# Patient Record
Sex: FEMALE | Race: WHITE | Employment: FULL TIME | URBAN - METROPOLITAN AREA
[De-identification: names, ages, dates, MRNs, and addresses within clinical notes are randomized per-mention and may not be internally consistent; named-entity substitution may affect disease eponyms.]

---

## 2020-02-24 ENCOUNTER — HOSPITAL ENCOUNTER (OUTPATIENT)
Age: 25
Discharge: HOME | End: 2020-02-26
Payer: COMMERCIAL

## 2020-02-24 ENCOUNTER — HOSPITAL ENCOUNTER (OUTPATIENT)
Dept: ULTRASOUND IMAGING | Age: 25
Discharge: HOME | End: 2020-02-24
Payer: COMMERCIAL

## 2020-02-24 LAB
HCT VFR BLD CALC: 40.9 % (ref 34–48)
HEMOGLOBIN: 13 G/DL (ref 11.5–15.5)
MCH RBC QN AUTO: 29.1 PG (ref 26–35)
MCHC RBC AUTO-ENTMCNC: 31.8 % (ref 32–34.5)
MCV RBC AUTO: 91.5 FL (ref 80–99.9)
PDW BLD-RTO: 12.5 FL (ref 11.5–15)
PLATELET # BLD: 310 E9/L (ref 130–450)
PMV BLD AUTO: 9.3 FL (ref 7–12)
RBC # BLD: 4.47 E12/L (ref 3.5–5.5)
WBC # BLD: 9.8 E9/L (ref 4.5–11.5)

## 2020-02-24 PROCEDURE — G0123 SCREEN CERV/VAG THIN LAYER: HCPCS

## 2020-02-24 PROCEDURE — 87491 CHLMYD TRACH DNA AMP PROBE: CPT

## 2020-02-24 PROCEDURE — 86901 BLOOD TYPING SEROLOGIC RH(D): CPT

## 2020-02-24 PROCEDURE — 87591 N.GONORRHOEAE DNA AMP PROB: CPT

## 2020-02-24 PROCEDURE — 76817 TRANSVAGINAL US OBSTETRIC: CPT

## 2020-02-24 PROCEDURE — 85027 COMPLETE CBC AUTOMATED: CPT

## 2020-02-24 PROCEDURE — 86900 BLOOD TYPING SEROLOGIC ABO: CPT

## 2020-02-24 PROCEDURE — 86850 RBC ANTIBODY SCREEN: CPT

## 2020-02-25 LAB
ABO/RH: NORMAL
ANTIBODY SCREEN: NORMAL

## 2020-02-27 ENCOUNTER — PREP FOR PROCEDURE (OUTPATIENT)
Dept: OBGYN | Age: 25
End: 2020-02-27

## 2020-02-27 RX ORDER — SODIUM CHLORIDE 0.9 % (FLUSH) 0.9 %
10 SYRINGE (ML) INJECTION EVERY 12 HOURS SCHEDULED
Status: CANCELLED | OUTPATIENT
Start: 2020-02-27

## 2020-02-27 RX ORDER — SODIUM CHLORIDE 0.9 % (FLUSH) 0.9 %
10 SYRINGE (ML) INJECTION PRN
Status: CANCELLED | OUTPATIENT
Start: 2020-02-27

## 2020-02-27 RX ORDER — SODIUM CHLORIDE, SODIUM LACTATE, POTASSIUM CHLORIDE, CALCIUM CHLORIDE 600; 310; 30; 20 MG/100ML; MG/100ML; MG/100ML; MG/100ML
INJECTION, SOLUTION INTRAVENOUS CONTINUOUS
Status: CANCELLED | OUTPATIENT
Start: 2020-02-27

## 2020-02-28 ENCOUNTER — ANESTHESIA (OUTPATIENT)
Dept: OPERATING ROOM | Age: 25
End: 2020-02-28
Payer: COMMERCIAL

## 2020-02-28 ENCOUNTER — ANESTHESIA EVENT (OUTPATIENT)
Dept: OPERATING ROOM | Age: 25
End: 2020-02-28
Payer: COMMERCIAL

## 2020-02-28 ENCOUNTER — HOSPITAL ENCOUNTER (OUTPATIENT)
Age: 25
Setting detail: OUTPATIENT SURGERY
Discharge: HOME | End: 2020-02-28
Attending: OBSTETRICS & GYNECOLOGY | Admitting: OBSTETRICS & GYNECOLOGY
Payer: COMMERCIAL

## 2020-02-28 VITALS
HEIGHT: 64 IN | BODY MASS INDEX: 24.75 KG/M2 | DIASTOLIC BLOOD PRESSURE: 60 MMHG | SYSTOLIC BLOOD PRESSURE: 115 MMHG | WEIGHT: 145 LBS | TEMPERATURE: 97.7 F | RESPIRATION RATE: 18 BRPM | OXYGEN SATURATION: 100 % | HEART RATE: 78 BPM

## 2020-02-28 VITALS — SYSTOLIC BLOOD PRESSURE: 107 MMHG | TEMPERATURE: 97.2 F | OXYGEN SATURATION: 100 % | DIASTOLIC BLOOD PRESSURE: 55 MMHG

## 2020-02-28 LAB
ABO/RH: NORMAL
ANTIBODY SCREEN: NORMAL
CHLAMYDIA BY THIN PREP: NEGATIVE
N. GONORRHOEAE DNA, THIN PREP: NEGATIVE
RHIG LOT NUMBER: NORMAL
SOURCE: NORMAL

## 2020-02-28 PROCEDURE — 7100000000 HC PACU RECOVERY - FIRST 15 MIN: Performed by: OBSTETRICS & GYNECOLOGY

## 2020-02-28 PROCEDURE — 36415 COLL VENOUS BLD VENIPUNCTURE: CPT

## 2020-02-28 PROCEDURE — 3700000000 HC ANESTHESIA ATTENDED CARE: Performed by: OBSTETRICS & GYNECOLOGY

## 2020-02-28 PROCEDURE — 3700000001 HC ADD 15 MINUTES (ANESTHESIA): Performed by: OBSTETRICS & GYNECOLOGY

## 2020-02-28 PROCEDURE — 6360000002 HC RX W HCPCS: Performed by: OBSTETRICS & GYNECOLOGY

## 2020-02-28 PROCEDURE — 2580000003 HC RX 258: Performed by: NURSE ANESTHETIST, CERTIFIED REGISTERED

## 2020-02-28 PROCEDURE — 2580000003 HC RX 258: Performed by: OBSTETRICS & GYNECOLOGY

## 2020-02-28 PROCEDURE — 7100000011 HC PHASE II RECOVERY - ADDTL 15 MIN: Performed by: OBSTETRICS & GYNECOLOGY

## 2020-02-28 PROCEDURE — 86900 BLOOD TYPING SEROLOGIC ABO: CPT

## 2020-02-28 PROCEDURE — 86850 RBC ANTIBODY SCREEN: CPT

## 2020-02-28 PROCEDURE — 6360000002 HC RX W HCPCS: Performed by: NURSE ANESTHETIST, CERTIFIED REGISTERED

## 2020-02-28 PROCEDURE — 7100000001 HC PACU RECOVERY - ADDTL 15 MIN: Performed by: OBSTETRICS & GYNECOLOGY

## 2020-02-28 PROCEDURE — 2500000003 HC RX 250 WO HCPCS: Performed by: NURSE ANESTHETIST, CERTIFIED REGISTERED

## 2020-02-28 PROCEDURE — 7100000010 HC PHASE II RECOVERY - FIRST 15 MIN: Performed by: OBSTETRICS & GYNECOLOGY

## 2020-02-28 PROCEDURE — 86901 BLOOD TYPING SEROLOGIC RH(D): CPT

## 2020-02-28 PROCEDURE — 88305 TISSUE EXAM BY PATHOLOGIST: CPT

## 2020-02-28 PROCEDURE — 3600000012 HC SURGERY LEVEL 2 ADDTL 15MIN: Performed by: OBSTETRICS & GYNECOLOGY

## 2020-02-28 PROCEDURE — 2709999900 HC NON-CHARGEABLE SUPPLY: Performed by: OBSTETRICS & GYNECOLOGY

## 2020-02-28 PROCEDURE — 3600000002 HC SURGERY LEVEL 2 BASE: Performed by: OBSTETRICS & GYNECOLOGY

## 2020-02-28 PROCEDURE — 2500000003 HC RX 250 WO HCPCS: Performed by: OBSTETRICS & GYNECOLOGY

## 2020-02-28 RX ORDER — SODIUM CHLORIDE 0.9 % (FLUSH) 0.9 %
10 SYRINGE (ML) INJECTION PRN
Status: DISCONTINUED | OUTPATIENT
Start: 2020-02-28 | End: 2020-02-28 | Stop reason: HOSPADM

## 2020-02-28 RX ORDER — DIPHENHYDRAMINE HYDROCHLORIDE 50 MG/ML
12.5 INJECTION INTRAMUSCULAR; INTRAVENOUS
Status: DISCONTINUED | OUTPATIENT
Start: 2020-02-28 | End: 2020-02-28 | Stop reason: HOSPADM

## 2020-02-28 RX ORDER — MIDAZOLAM HYDROCHLORIDE 1 MG/ML
INJECTION INTRAMUSCULAR; INTRAVENOUS PRN
Status: DISCONTINUED | OUTPATIENT
Start: 2020-02-28 | End: 2020-02-28 | Stop reason: SDUPTHER

## 2020-02-28 RX ORDER — SODIUM CHLORIDE 0.9 % (FLUSH) 0.9 %
10 SYRINGE (ML) INJECTION EVERY 12 HOURS SCHEDULED
Status: DISCONTINUED | OUTPATIENT
Start: 2020-02-28 | End: 2020-02-28 | Stop reason: HOSPADM

## 2020-02-28 RX ORDER — HYDROCODONE BITARTRATE AND ACETAMINOPHEN 5; 325 MG/1; MG/1
1 TABLET ORAL PRN
Status: DISCONTINUED | OUTPATIENT
Start: 2020-02-28 | End: 2020-02-28 | Stop reason: HOSPADM

## 2020-02-28 RX ORDER — ONDANSETRON 2 MG/ML
INJECTION INTRAMUSCULAR; INTRAVENOUS PRN
Status: DISCONTINUED | OUTPATIENT
Start: 2020-02-28 | End: 2020-02-28 | Stop reason: SDUPTHER

## 2020-02-28 RX ORDER — SODIUM CHLORIDE, SODIUM LACTATE, POTASSIUM CHLORIDE, CALCIUM CHLORIDE 600; 310; 30; 20 MG/100ML; MG/100ML; MG/100ML; MG/100ML
INJECTION, SOLUTION INTRAVENOUS CONTINUOUS
Status: DISCONTINUED | OUTPATIENT
Start: 2020-02-28 | End: 2020-02-28 | Stop reason: HOSPADM

## 2020-02-28 RX ORDER — FENTANYL CITRATE 50 UG/ML
INJECTION, SOLUTION INTRAMUSCULAR; INTRAVENOUS PRN
Status: DISCONTINUED | OUTPATIENT
Start: 2020-02-28 | End: 2020-02-28 | Stop reason: SDUPTHER

## 2020-02-28 RX ORDER — HYDROCODONE BITARTRATE AND ACETAMINOPHEN 5; 325 MG/1; MG/1
2 TABLET ORAL PRN
Status: DISCONTINUED | OUTPATIENT
Start: 2020-02-28 | End: 2020-02-28 | Stop reason: HOSPADM

## 2020-02-28 RX ORDER — IBUPROFEN 600 MG/1
600 TABLET ORAL EVERY 6 HOURS PRN
Qty: 16 TABLET | Refills: 0 | Status: ON HOLD | OUTPATIENT
Start: 2020-02-28 | End: 2021-01-20 | Stop reason: ALTCHOICE

## 2020-02-28 RX ORDER — DEXAMETHASONE SODIUM PHOSPHATE 4 MG/ML
INJECTION, SOLUTION INTRA-ARTICULAR; INTRALESIONAL; INTRAMUSCULAR; INTRAVENOUS; SOFT TISSUE PRN
Status: DISCONTINUED | OUTPATIENT
Start: 2020-02-28 | End: 2020-02-28 | Stop reason: SDUPTHER

## 2020-02-28 RX ORDER — DOXYCYCLINE HYCLATE 100 MG/1
100 CAPSULE ORAL 2 TIMES DAILY
Qty: 10 CAPSULE | Refills: 0 | Status: SHIPPED | OUTPATIENT
Start: 2020-02-28 | End: 2020-03-04

## 2020-02-28 RX ORDER — MEPERIDINE HYDROCHLORIDE 25 MG/ML
12.5 INJECTION INTRAMUSCULAR; INTRAVENOUS; SUBCUTANEOUS EVERY 5 MIN PRN
Status: DISCONTINUED | OUTPATIENT
Start: 2020-02-28 | End: 2020-02-28 | Stop reason: HOSPADM

## 2020-02-28 RX ORDER — SODIUM CHLORIDE 9 MG/ML
INJECTION, SOLUTION INTRAVENOUS CONTINUOUS PRN
Status: DISCONTINUED | OUTPATIENT
Start: 2020-02-28 | End: 2020-02-28 | Stop reason: SDUPTHER

## 2020-02-28 RX ORDER — LIDOCAINE HYDROCHLORIDE 20 MG/ML
INJECTION, SOLUTION EPIDURAL; INFILTRATION; INTRACAUDAL; PERINEURAL PRN
Status: DISCONTINUED | OUTPATIENT
Start: 2020-02-28 | End: 2020-02-28 | Stop reason: SDUPTHER

## 2020-02-28 RX ADMIN — LIDOCAINE HYDROCHLORIDE 100 MG: 20 INJECTION, SOLUTION EPIDURAL; INFILTRATION; INTRACAUDAL; PERINEURAL at 15:40

## 2020-02-28 RX ADMIN — FENTANYL CITRATE 100 MCG: 50 INJECTION, SOLUTION INTRAMUSCULAR; INTRAVENOUS at 15:40

## 2020-02-28 RX ADMIN — DOXYCYCLINE 100 MG: 100 INJECTION, POWDER, LYOPHILIZED, FOR SOLUTION INTRAVENOUS at 15:33

## 2020-02-28 RX ADMIN — ONDANSETRON HYDROCHLORIDE 4 MG: 2 INJECTION, SOLUTION INTRAMUSCULAR; INTRAVENOUS at 15:46

## 2020-02-28 RX ADMIN — SODIUM CHLORIDE: 9 INJECTION, SOLUTION INTRAVENOUS at 15:35

## 2020-02-28 RX ADMIN — MIDAZOLAM 2 MG: 1 INJECTION INTRAMUSCULAR; INTRAVENOUS at 15:34

## 2020-02-28 RX ADMIN — DEXAMETHASONE SODIUM PHOSPHATE 10 MG: 4 INJECTION, SOLUTION INTRAMUSCULAR; INTRAVENOUS at 15:46

## 2020-02-28 RX ADMIN — HUMAN RHO(D) IMMUNE GLOBULIN 300 MCG: 300 INJECTION, SOLUTION INTRAMUSCULAR at 17:03

## 2020-02-28 RX ADMIN — Medication 999 ML/HR: at 15:51

## 2020-02-28 RX ADMIN — SODIUM CHLORIDE: 9 INJECTION, SOLUTION INTRAVENOUS at 16:11

## 2020-02-28 ASSESSMENT — PAIN SCALES - GENERAL
PAINLEVEL_OUTOF10: 0

## 2020-02-28 ASSESSMENT — PULMONARY FUNCTION TESTS
PIF_VALUE: 21
PIF_VALUE: 7
PIF_VALUE: 18
PIF_VALUE: 18
PIF_VALUE: 2
PIF_VALUE: 18
PIF_VALUE: 2
PIF_VALUE: 18
PIF_VALUE: 18
PIF_VALUE: 15
PIF_VALUE: 4
PIF_VALUE: 4
PIF_VALUE: 2
PIF_VALUE: 18
PIF_VALUE: 15
PIF_VALUE: 18
PIF_VALUE: 2
PIF_VALUE: 18
PIF_VALUE: 18
PIF_VALUE: 1
PIF_VALUE: 18
PIF_VALUE: 2
PIF_VALUE: 21
PIF_VALUE: 1
PIF_VALUE: 5
PIF_VALUE: 0
PIF_VALUE: 18
PIF_VALUE: 16
PIF_VALUE: 4
PIF_VALUE: 15
PIF_VALUE: 2
PIF_VALUE: 15
PIF_VALUE: 0

## 2020-02-28 ASSESSMENT — PAIN DESCRIPTION - PAIN TYPE
TYPE: SURGICAL PAIN
TYPE: SURGICAL PAIN

## 2020-02-28 ASSESSMENT — PAIN DESCRIPTION - LOCATION
LOCATION: ABDOMEN

## 2020-02-28 ASSESSMENT — PAIN - FUNCTIONAL ASSESSMENT: PAIN_FUNCTIONAL_ASSESSMENT: 0-10

## 2020-02-28 NOTE — PROGRESS NOTES
Aurea Martinez [address] Dr Sangeeta Campos called regarding delay in OR because pt ate cereal this am also need pitocin order.   [address] with Dr Sangeeta Campos  Pt update given and also pitocin ordered
products on the day of surgery    [x] If not already done, you can expect a call from registration    [x] You can expect a call the business day prior to procedure to notify you if your arrival time changes    [x] If you receive a survey after surgery we would greatly appreciate your comments    [] Parent/guardian of a minor must accompany their child and remain on the premises  the entire time they are under our care     [] Pediatric patients may bring favorite toy, blanket or comfort item with them    [] A caregiver or family member must remain with the patient during their stay if they are mentally handicapped, have dementia, disoriented or unable to use a call light or would be a safety concern if left unattended    [x] Please notify surgeon if you develop any illness between now and time of surgery (cold, cough, sore throat, fever, nausea, vomiting) or any signs of infections  including skin, wounds, and dental.    [x]  The [facility] is available to fill your prescription here on your day of surgery, ask your preop nurse for details    [x] Other instructions  EDUCATIONAL MATERIALS PROVIDED:    [] PAT Preoperative Education Packet/Booklet     [] Medication List    [] Fluoroscopy Information Pamphlet    [] Transfusion bracelet applied with instructions    [] Joint replacement video reviewed    [] Shower with soap, lather and rinse well, and use CHG wipes provided the evening before surgery as instructed

## 2020-02-28 NOTE — ANESTHESIA PRE PROCEDURE
Department of Anesthesiology  Preprocedure Note       Name:  Kwasi Plummer   Age:  30 y.o.  :  1995                                          MRN:  [id number]         Date:  2020      Surgeon: Kentrell Garcia):  Tucker Terrazas MD    Procedure: DILATATION AND CURETTAGE SUCTION (N/A Vagina )    Medications prior to admission:   Prior to Admission medications    Medication Sig Start Date End Date Taking?  Authorizing Provider   Prenatal Multivit-Min-Fe-FA (PRE- FORMULA PO) Take 1 tablet by mouth daily LD 20   Yes Historical Provider, MD   ibuprofen (ADVIL;MOTRIN) 800 MG tablet Take 1 tablet by mouth every 8 hours  Patient taking differently: Take 800 mg by mouth every 8 hours LD 20   Shawnee Flores MD       Current medications:    Current Facility-Administered Medications   Medication Dose Route Frequency Provider Last Rate Last Dose    doxycycline (VIBRAMYCIN) 100 mg in dextrose 5 % 100 mL IVPB  100 mg Intravenous On Call to OR Tucker Terrazas MD        lactated ringers infusion   Intravenous Continuous Tucker Terrazas MD        sodium chloride flush 0.9 % injection 10 mL  10 mL Intravenous 2 times per day Tucker Terrazas MD        sodium chloride flush 0.9 % injection 10 mL  10 mL Intravenous PRN Tucker Terrazas MD        meperidine (DEMEROL) injection 12.5 mg  12.5 mg Intravenous Q5 Min PRN Denver Andreas, MD        diphenhydrAMINE (BENADRYL) injection 12.5 mg  12.5 mg Intravenous Once PRN Denver Andreas, MD        HYDROcodone-acetaminophen [facility]) 5-325 MG per tablet 1 tablet  1 tablet Oral PRN Denver Andreas, MD        Or    HYDROcodone-acetaminophen (NORCO) 5-325 MG per tablet 2 tablet  2 tablet Oral PRN Denver Andreas, MD        HYDROmorphone (DILAUDID) injection 0.5 mg  0.5 mg Intravenous Q5 Min PRN Denver Andreas, MD        HYDROmorphone (DILAUDID) injection 0.25 mg  0.25 mg Intravenous Q5 Min PRN Denver Andreas, MD         Facility-Administered Medications Ordered in Other Encounters   Medication Dose Route Frequency Provider Last Rate Last Dose    oxytocin (PITOCIN) 30 units in 500 mL infusion  1 alex-units/min Intravenous Continuous Harley Ferrell MD           Allergies: Allergies   Allergen Reactions    Sulfa Antibiotics Hives     Hives as a child       Problem List:  There is no problem list on file for this patient. Past Medical History:        Diagnosis Date    Missed ab     for OR 2-28-20     Need for rhogam due to Rh negative mother 2016       Past Surgical History:        Procedure Laterality Date    ADENOIDECTOMY Bilateral 2002    WISDOM TOOTH EXTRACTION         Social History:    Social History     Tobacco Use    Smoking status: Never Smoker    Smokeless tobacco: Never Used   Substance Use Topics    Alcohol use: No                                Counseling given: Not Answered      Vital Signs (Current):   Vitals:    02/25/20 1546 02/28/20 1258   BP:  (!) 120/58   Pulse:  88   Resp:  20   Temp:  97.9 °F (36.6 °C)   TempSrc:  Temporal   SpO2:  100%   Weight: 145 lb (65.8 kg) 145 lb (65.8 kg)   Height: 5' 4\" (1.626 m) 5' 4\" (1.626 m)                                              BP Readings from Last 3 Encounters:   02/28/20 (!) 120/58   07/04/16 143/73   06/07/16 127/72       NPO Status: Time of last liquid consumption: 0715                        Time of last solid consumption: 0715                        Date of last liquid consumption: 02/27/20                        Date of last solid food consumption: 02/27/20    BMI:   Wt Readings from Last 3 Encounters:   02/28/20 145 lb (65.8 kg)   07/02/16 173 lb (78.5 kg)   06/07/16 169 lb (76.7 kg)     Body mass index is 24.89 kg/m².     CBC:   Lab Results   Component Value Date    WBC 9.8 02/24/2020    RBC 4.47 02/24/2020    HGB 13.0 02/24/2020    HCT 40.9 02/24/2020    MCV 91.5 02/24/2020    RDW 12.5 02/24/2020     02/24/2020       CMP:   Lab Results   Component Value Date

## 2020-02-28 NOTE — BRIEF OP NOTE
Brief Postoperative Note  ______________________________________________________________    Patient: Mateus Bhatti  YOB: 1995  MRN: [id number]  Date of Procedure: 2/28/2020    Pre-Op Diagnosis: MISSED AB    Post-Op Diagnosis: Same       Procedure(s): SUCTION DILATATION AND CURETTAGE     Anesthesia: General    Surgeon(s):  Ganga Rebolledo MD    First Assistant: Anayeli Sanon PA-C provided ultrasound guidance    Estimated Blood Loss (mL): 50     Fluids: IV Crystalloid & Pitocin per Anesthesia    Complications: None    Specimens:   ID Type Source Tests Collected by Time Destination   A : PRODUCTS OF CONCEPTION Tissue Tissue SURGICAL PATHOLOGY Ganga Rebolledo MD 2/28/2020 1526        Findings: midline echo noted at end of procedure    Disposition: to PACU in stable condition    Jamia Vincent PA-C  Date: 2/28/2020  Time: 4:10 PM

## 2020-02-29 NOTE — OP NOTE
[address] [address]                                OPERATIVE REPORT    PATIENT NAME: Gissell Alex                     :        1995  MED REC NO:   [id number]                            ROOM:  ACCOUNT NO:   [de-identified]                           ADMIT DATE: 2020  PROVIDER:     Pieter Machado MD    DATE OF PROCEDURE:  2020    PREOPERATIVE DIAGNOSIS:  Missed . POSTOPERATIVE DIAGNOSIS:  Missed . PROCEDURE PERFORMED:  Suction D and C under ultrasound guidance. SURGEON:  Pieter Machado MD.    ASSISTANT:  DAVID Iyer.    SPECIMENS:  Products of conception. ESTIMATED BLOOD LOSS:  50 mL. COMPLICATIONS:  None. DESCRIPTION OF PROCEDURE:  The patient was brought to the operative  suite. I have talked to her in the preop area and we reviewed again the  risks of the procedure. She agreed to proceed. She was brought back to  the operative suite, placed under general anesthesia. She was placed in  dorsal lithotomy position, placed in the Anice Cure stirrups, prepped and  draped in the usual sterile fashion. Pelvic exam under anesthesia was  performed that was normal.  Anterior lip of the cervix was grasped with  tenaculum. The entire procedure was done under ultrasound guidance. I  dilated the cervix to allow a size 7 curved curette to be inserted. Suction curettage was done. A gentle sharp curettage was done. There  was a good midline echo. No bleeding. Tenaculum was removed. Tenaculum site was hemostatic. The patient did receive doxycycline,  also Pitocin during the procedure. I told the patient I wanted to see  her in a few weeks in the office. I gave her precautions, to call if  pain, fever, heavy bleeding, or concerns.   Told her I am going to send  her home on doxycycline 100 mg b.i.d. for five days and she understands  she needs a RhoGam shot.        Shelby Baig MD    D: 02/28/2020 16:09:17       T: 02/28/2020 19:30:58     [email or web address]  Job#: [id number]     Doc#: [id number]    CC:

## 2020-04-17 ENCOUNTER — HOSPITAL ENCOUNTER (OUTPATIENT)
Age: 25
Discharge: HOME | End: 2020-04-19
Payer: COMMERCIAL

## 2020-04-17 LAB
GONADOTROPIN, CHORIONIC (HCG) QUANT: 48.8 MIU/ML
HCT VFR BLD CALC: 46.4 % (ref 34–48)
HEMOGLOBIN: 14.7 G/DL (ref 11.5–15.5)
MCH RBC QN AUTO: 29 PG (ref 26–35)
MCHC RBC AUTO-ENTMCNC: 31.7 % (ref 32–34.5)
MCV RBC AUTO: 91.5 FL (ref 80–99.9)
PDW BLD-RTO: 11.9 FL (ref 11.5–15)
PLATELET # BLD: 346 E9/L (ref 130–450)
PMV BLD AUTO: 9.5 FL (ref 7–12)
RBC # BLD: 5.07 E12/L (ref 3.5–5.5)
WBC # BLD: 7 E9/L (ref 4.5–11.5)

## 2020-04-17 PROCEDURE — 86850 RBC ANTIBODY SCREEN: CPT

## 2020-04-17 PROCEDURE — 84702 CHORIONIC GONADOTROPIN TEST: CPT

## 2020-04-17 PROCEDURE — 86880 COOMBS TEST DIRECT: CPT

## 2020-04-17 PROCEDURE — 86900 BLOOD TYPING SEROLOGIC ABO: CPT

## 2020-04-17 PROCEDURE — 85027 COMPLETE CBC AUTOMATED: CPT

## 2020-04-17 PROCEDURE — 86870 RBC ANTIBODY IDENTIFICATION: CPT

## 2020-04-17 PROCEDURE — 86901 BLOOD TYPING SEROLOGIC RH(D): CPT

## 2020-04-18 LAB
ABO/RH: NORMAL
ANTIBODY IDENTIFICATION: NORMAL
ANTIBODY IDENTIFICATION: NORMAL
ANTIBODY SCREEN: NORMAL
DAT POLYSPECIFIC: NORMAL

## 2020-04-20 ENCOUNTER — HOSPITAL ENCOUNTER (OUTPATIENT)
Age: 25
Discharge: HOME | End: 2020-04-22
Payer: COMMERCIAL

## 2020-04-20 LAB — GONADOTROPIN, CHORIONIC (HCG) QUANT: 14.4 MIU/ML

## 2020-04-20 PROCEDURE — 84702 CHORIONIC GONADOTROPIN TEST: CPT

## 2020-04-24 ENCOUNTER — HOSPITAL ENCOUNTER (OUTPATIENT)
Age: 25
Discharge: HOME | End: 2020-04-26
Payer: COMMERCIAL

## 2020-04-24 LAB — GONADOTROPIN, CHORIONIC (HCG) QUANT: 4 MIU/ML

## 2020-04-24 PROCEDURE — 84702 CHORIONIC GONADOTROPIN TEST: CPT

## 2020-12-04 ENCOUNTER — HOSPITAL ENCOUNTER (OUTPATIENT)
Age: 25
Discharge: HOME | End: 2020-12-04
Payer: COMMERCIAL

## 2020-12-04 LAB
ABO/RH: NORMAL
ANTIBODY SCREEN: NORMAL
RHIG LOT NUMBER: NORMAL

## 2020-12-04 PROCEDURE — 6360000002 HC RX W HCPCS: Performed by: OBSTETRICS & GYNECOLOGY

## 2020-12-04 PROCEDURE — 36415 COLL VENOUS BLD VENIPUNCTURE: CPT

## 2020-12-04 PROCEDURE — 86901 BLOOD TYPING SEROLOGIC RH(D): CPT

## 2020-12-04 PROCEDURE — 86900 BLOOD TYPING SEROLOGIC ABO: CPT

## 2020-12-04 PROCEDURE — 86850 RBC ANTIBODY SCREEN: CPT

## 2020-12-04 PROCEDURE — 96372 THER/PROPH/DIAG INJ SC/IM: CPT

## 2020-12-04 RX ADMIN — HUMAN RHO(D) IMMUNE GLOBULIN 300 MCG: 300 INJECTION, SOLUTION INTRAMUSCULAR at 12:20

## 2021-01-20 ENCOUNTER — HOSPITAL ENCOUNTER (OUTPATIENT)
Age: 26
Setting detail: OBSERVATION
Discharge: HOME | End: 2021-01-20
Attending: OBSTETRICS & GYNECOLOGY | Admitting: OBSTETRICS & GYNECOLOGY
Payer: COMMERCIAL

## 2021-01-20 VITALS
WEIGHT: 175 LBS | SYSTOLIC BLOOD PRESSURE: 135 MMHG | RESPIRATION RATE: 18 BRPM | HEART RATE: 122 BPM | DIASTOLIC BLOOD PRESSURE: 65 MMHG | HEIGHT: 64 IN | TEMPERATURE: 98.1 F | BODY MASS INDEX: 29.88 KG/M2

## 2021-01-20 PROBLEM — O26.93 COMPLICATED PREGNANCY, THIRD TRIMESTER: Status: ACTIVE | Noted: 2021-01-20

## 2021-01-20 PROCEDURE — G0378 HOSPITAL OBSERVATION PER HR: HCPCS

## 2021-01-20 PROCEDURE — G0379 DIRECT REFER HOSPITAL OBSERV: HCPCS

## 2021-01-21 NOTE — PROGRESS NOTES
Pt ambulatory to unit with c/o decreased fetal movement throughout the day, does state she has felt some movement but less than normal.  at 37.1 weeks gestation. Denies any LOF or vaginal bleeding. Oriented to Tr 2 and EFM applied.

## 2021-01-21 NOTE — PROGRESS NOTES
Dr. Ira Horton called and informed of pt arrival and complaints. Reviewed EFM tracing with reactive FHT, baseline 150's with accelerations and moderate variability. [place] showing irritability and pt admits to feeling occasional contraction but nothing very painful or consistent.  aware of pt reassured of fetal movements being less noticeable further into pregnancy, verbal understanding from patient and FOB. No further orders at this time, pt is ok for discharge home. Continue kick counts and keep scheduled appointment.

## 2021-02-04 ENCOUNTER — HOSPITAL ENCOUNTER (OUTPATIENT)
Age: 26
Setting detail: OBSERVATION
Discharge: HOME | End: 2021-02-05
Attending: OBSTETRICS & GYNECOLOGY | Admitting: OBSTETRICS & GYNECOLOGY
Payer: COMMERCIAL

## 2021-02-04 PROBLEM — O26.899 ABDOMINAL CRAMPING COMPLICATING PREGNANCY: Status: ACTIVE | Noted: 2021-02-04

## 2021-02-04 PROBLEM — R10.9 ABDOMINAL CRAMPING COMPLICATING PREGNANCY: Status: ACTIVE | Noted: 2021-02-04

## 2021-02-04 PROCEDURE — G0379 DIRECT REFER HOSPITAL OBSERV: HCPCS

## 2021-02-04 PROCEDURE — G0378 HOSPITAL OBSERVATION PER HR: HCPCS

## 2021-02-05 VITALS
TEMPERATURE: 98 F | DIASTOLIC BLOOD PRESSURE: 74 MMHG | RESPIRATION RATE: 18 BRPM | HEART RATE: 108 BPM | SYSTOLIC BLOOD PRESSURE: 135 MMHG

## 2021-02-05 PROCEDURE — G0378 HOSPITAL OBSERVATION PER HR: HCPCS

## 2021-02-05 NOTE — PROGRESS NOTES
Pt ambulatory to unit with c/o contractions \"all day, now they are every 5 minutes\"  at 39.2 weeks gestation. Denies LOF or vaginal bleeding. Maternal perception of fetal movement verified. Oriented to TR2 and EFM applied.

## 2021-02-05 NOTE — PROGRESS NOTES
Genna Estrada called and made aware of pt requesting to go home, states her contractions \"have stopped coming so close and aren't strong anymore\". Pt has scheduled OB appointment today, 2/5/21, at 1:30pm. Eleni Novoa for discharge home at this time.

## 2021-02-06 NOTE — PROGRESS NOTES
Department of Obstetrics and Gynecology   Obstetrics History and Physical      Ashley Martinez  2021  [id number]    CHIEF COMPLAINT: Lower abdominal pain    HISTORY OF PRESENT ILLNESS:      The patient is a 30 y.o. female  at 39w3d.   With lower abdominal pain and contractions cramping no vaginal bleeding no vaginal leaking  OB History        3    Para   1    Term   1       0    AB   1    Living   1       SAB   1    TAB   0    Ectopic   0    Molar   0    Multiple   0    Live Births   1            Patient presents with a chief complaint as above and is being admitted for evaluation and management of early labor    Estimated Due Date: Estimated Date of Delivery: 21    PRENATAL CARE:    Complicated by:   Patient Active Problem List   Diagnosis Code    Complicated pregnancy, third trimester O26.93    Abdominal cramping complicating pregnancy K64.124, R10.9       PAST OB HISTORY  OB History        3    Para   1    Term   1       0    AB   1    Living   1       SAB   1    TAB   0    Ectopic   0    Molar   0    Multiple   0    Live Births   1                Past Medical History:        Diagnosis Date    Missed ab     for OR 20     Need for rhogam due to Rh negative mother      Past Surgical History:        Procedure Laterality Date    ADENOIDECTOMY Bilateral     DILATION AND CURETTAGE OF UTERUS N/A 2020    [facility] [address] [facility]       Allergies:  Sulfa antibiotics  Social History:    Social History     Socioeconomic History    Marital status: Single     Spouse name: Not on file    Number of children: Not on file    Years of education: Not on file    Highest education level: Not on file   Occupational History    Not on file   Social Needs    Financial resource strain: Not on file    Food insecurity     Worry: Not on file     Inability: Not on file   [employer] needs Medical: Not on file     Non-medical: Not on file   Tobacco Use    Smoking status: Never Smoker    Smokeless tobacco: Never Used   Substance and Sexual Activity    Alcohol use: No    Drug use: No    Sexual activity: Yes     Partners: Male   Lifestyle    Physical activity     Days per week: Not on file     Minutes per session: Not on file    Stress: Not on file   Relationships    Social connections     Talks on phone: Not on file     Gets together: Not on file     Attends Religion service: Not on file     Active member of club or organization: Not on file     Attends meetings of clubs or organizations: Not on file     Relationship status: Not on file    Intimate partner violence     Fear of current or ex partner: Not on file     Emotionally abused: Not on file     Physically abused: Not on file     Forced sexual activity: Not on file   Other Topics Concern    Not on file   Social History Narrative    Not on file     Family History:   No family history on file. Medications Prior to Admission:  No medications prior to admission. REVIEW OF SYSTEMS:    CONSTITUTIONAL:  negative  RESPIRATORY:  negative  CARDIOVASCULAR:  negative  GASTROINTESTINAL:  Negative  GENITOURINARY: Negative  ALLERGIC/IMMUNOLOGIC:  negative  NEUROLOGICAL:  negative  BEHAVIOR/PSYCH:  negative    PHYSICAL EXAM:  Blood pressure 135/74, pulse 108, temperature 98 °F (36.7 °C), temperature source Oral, resp. rate 18, last menstrual period 05/05/2020, currently breastfeeding. General appearance:  awake, alert, cooperative, no apparent distress, and appears stated age  Abdomen:  Gravid  uterus, consistent with her gestational age 38w3d, irregular uterine contractions. , CVA tenderness No      Fetal heart rate:  Reassuring.  140,with accels and moderate variability,no decels,  Pelvis:  Adequate pelvis  Cervix: soft   Closed   50%    -2  Presentation: CEPHALIC  Membranes:  intact  Extremities: nontender bilaterally,edema1+    DATA:  Labs: CBC:   Lab Results   Component Value Date    WBC 7.0 04/17/2020    RBC 5.07 04/17/2020    HGB 14.7 04/17/2020    HCT 46.4 04/17/2020    MCV 91.5 04/17/2020    RDW 11.9 04/17/2020     04/17/2020     CMP:    Lab Results   Component Value Date     07/21/2017    K 4.8 07/21/2017     07/21/2017    CO2 20 07/21/2017    BUN 14 07/21/2017    PROT 7.6 07/21/2017     U/A:  No components found for: [place] Elkins, USPGRAV, UPH, UPROTEIN, UGLUCOSE, UKETONE, UBILI, UBLOOD, [place], UUROBIL, [place], [facility], [place], [place], [place], [facility], [place]  TSH:    Lab Results   Component Value Date    TSH 2.090 07/21/2017     RPR:  No results found for: RPR  RUBELLA: No results found for: RUBELLAIGG  HEPBSAG:   Lab Results   Component Value Date    HBSAGI Non-Reactive 11/11/2015     HIV:  No results found for: HIV  HgBA1c:  No components found for: HGBA1C  Blood Type:  No results found for: ABOINT  RH:  No results found for: ANATITER, C3, C4, RF  Antibody Screen:  No components found for: ABSCINT  Gonorrhea:  No components found for: PRBCHLGC  Chlamydia:  No components found for: CCHLAM  gbs-STREP B SCREEN: No results found for: GBSAG    No orders to display       No results found for this visit on 02/04/21. ASSESSMENT AND PLAN:full term pregnancy,  Active Problems:    Abdominal cramping complicating pregnancy  Resolved Problems:    * No resolved hospital problems. *      Labor: OBSERVATION, anticipate normal delivery, routine labor orders  Fetus: Reassuring  GBS: Negative  IVFluids,labs,analgesics/epidural as needed  Will wait till the morning and reassess periodically to rule out labor . Middle of the night patient asked to go home she does not want to stay till morning so will let her go home and reassess in the morning at the office. Patient will call back as needed problems.       Electronically signed by Carolyn Nolan MD on 2/5/2021 at 7:26 PM

## 2021-02-09 ENCOUNTER — APPOINTMENT (OUTPATIENT)
Dept: LABOR AND DELIVERY | Age: 26
DRG: 560 | End: 2021-02-09
Payer: COMMERCIAL

## 2021-02-09 ENCOUNTER — ANESTHESIA (OUTPATIENT)
Dept: LABOR AND DELIVERY | Age: 26
DRG: 560 | End: 2021-02-09
Payer: COMMERCIAL

## 2021-02-09 ENCOUNTER — ANESTHESIA EVENT (OUTPATIENT)
Dept: LABOR AND DELIVERY | Age: 26
DRG: 560 | End: 2021-02-09
Payer: COMMERCIAL

## 2021-02-09 ENCOUNTER — HOSPITAL ENCOUNTER (INPATIENT)
Age: 26
LOS: 2 days | Discharge: HOME | DRG: 560 | End: 2021-02-11
Attending: OBSTETRICS & GYNECOLOGY | Admitting: OBSTETRICS & GYNECOLOGY
Payer: COMMERCIAL

## 2021-02-09 PROBLEM — Z3A.40 40 WEEKS GESTATION OF PREGNANCY: Status: ACTIVE | Noted: 2021-02-09

## 2021-02-09 LAB
ABO/RH: NORMAL
ALBUMIN SERPL-MCNC: 3.3 G/DL (ref 3.5–5.2)
ALP BLD-CCNC: 292 U/L (ref 35–104)
ALT SERPL-CCNC: 10 U/L (ref 0–32)
AMPHETAMINE SCREEN, URINE: NOT DETECTED
ANION GAP SERPL CALCULATED.3IONS-SCNC: 12 MMOL/L (ref 7–16)
ANTIBODY IDENTIFICATION: NORMAL
ANTIBODY SCREEN: NORMAL
AST SERPL-CCNC: 16 U/L (ref 0–31)
BARBITURATE SCREEN URINE: NOT DETECTED
BASOPHILS ABSOLUTE: 0.02 E9/L (ref 0–0.2)
BASOPHILS RELATIVE PERCENT: 0.2 % (ref 0–2)
BENZODIAZEPINE SCREEN, URINE: NOT DETECTED
BILIRUB SERPL-MCNC: 0.3 MG/DL (ref 0–1.2)
BUN BLDV-MCNC: 6 MG/DL (ref 6–20)
CALCIUM SERPL-MCNC: 8.6 MG/DL (ref 8.6–10.2)
CANNABINOID SCREEN URINE: NOT DETECTED
CHLORIDE BLD-SCNC: 107 MMOL/L (ref 98–107)
CO2: 17 MMOL/L (ref 22–29)
COCAINE METABOLITE SCREEN URINE: NOT DETECTED
CREAT SERPL-MCNC: 0.7 MG/DL (ref 0.5–1)
DAT POLYSPECIFIC: NORMAL
EOSINOPHILS ABSOLUTE: 0.08 E9/L (ref 0.05–0.5)
EOSINOPHILS RELATIVE PERCENT: 0.8 % (ref 0–6)
FENTANYL SCREEN, URINE: NOT DETECTED
GFR AFRICAN AMERICAN: >60
GFR NON-AFRICAN AMERICAN: >60 ML/MIN/1.73
GLUCOSE BLD-MCNC: 88 MG/DL (ref 74–99)
HCT VFR BLD CALC: 34.1 % (ref 34–48)
HEMOGLOBIN: 10.9 G/DL (ref 11.5–15.5)
IMMATURE GRANULOCYTES #: 0.11 E9/L
IMMATURE GRANULOCYTES %: 1.2 % (ref 0–5)
LYMPHOCYTES ABSOLUTE: 2.01 E9/L (ref 1.5–4)
LYMPHOCYTES RELATIVE PERCENT: 21.2 % (ref 20–42)
Lab: NORMAL
MCH RBC QN AUTO: 26.7 PG (ref 26–35)
MCHC RBC AUTO-ENTMCNC: 32 % (ref 32–34.5)
MCV RBC AUTO: 83.6 FL (ref 80–99.9)
METHADONE SCREEN, URINE: NOT DETECTED
MONOCYTES ABSOLUTE: 0.65 E9/L (ref 0.1–0.95)
MONOCYTES RELATIVE PERCENT: 6.9 % (ref 2–12)
NEUTROPHILS ABSOLUTE: 6.61 E9/L (ref 1.8–7.3)
NEUTROPHILS RELATIVE PERCENT: 69.7 % (ref 43–80)
OPIATE SCREEN URINE: NOT DETECTED
OXYCODONE URINE: NOT DETECTED
PDW BLD-RTO: 14 FL (ref 11.5–15)
PHENCYCLIDINE SCREEN URINE: NOT DETECTED
PLATELET # BLD: 255 E9/L (ref 130–450)
PMV BLD AUTO: 9.2 FL (ref 7–12)
POTASSIUM SERPL-SCNC: 3.7 MMOL/L (ref 3.5–5)
RBC # BLD: 4.08 E12/L (ref 3.5–5.5)
SARS-COV-2, NAAT: NOT DETECTED
SODIUM BLD-SCNC: 136 MMOL/L (ref 132–146)
TOTAL PROTEIN: 6.3 G/DL (ref 6.4–8.3)
WBC # BLD: 9.5 E9/L (ref 4.5–11.5)

## 2021-02-09 PROCEDURE — 2500000003 HC RX 250 WO HCPCS: Performed by: ANESTHESIOLOGY

## 2021-02-09 PROCEDURE — 6370000000 HC RX 637 (ALT 250 FOR IP): Performed by: OBSTETRICS & GYNECOLOGY

## 2021-02-09 PROCEDURE — 86901 BLOOD TYPING SEROLOGIC RH(D): CPT

## 2021-02-09 PROCEDURE — 3E0P7VZ INTRODUCTION OF HORMONE INTO FEMALE REPRODUCTIVE, VIA NATURAL OR ARTIFICIAL OPENING: ICD-10-PCS | Performed by: OBSTETRICS & GYNECOLOGY

## 2021-02-09 PROCEDURE — 36415 COLL VENOUS BLD VENIPUNCTURE: CPT

## 2021-02-09 PROCEDURE — 2580000003 HC RX 258: Performed by: OBSTETRICS & GYNECOLOGY

## 2021-02-09 PROCEDURE — 80307 DRUG TEST PRSMV CHEM ANLYZR: CPT

## 2021-02-09 PROCEDURE — 1220000001 HC SEMI PRIVATE L&D R&B

## 2021-02-09 PROCEDURE — 86880 COOMBS TEST DIRECT: CPT

## 2021-02-09 PROCEDURE — 86900 BLOOD TYPING SEROLOGIC ABO: CPT

## 2021-02-09 PROCEDURE — 10907ZC DRAINAGE OF AMNIOTIC FLUID, THERAPEUTIC FROM PRODUCTS OF CONCEPTION, VIA NATURAL OR ARTIFICIAL OPENING: ICD-10-PCS | Performed by: OBSTETRICS & GYNECOLOGY

## 2021-02-09 PROCEDURE — U0002 COVID-19 LAB TEST NON-CDC: HCPCS

## 2021-02-09 PROCEDURE — 85025 COMPLETE CBC W/AUTO DIFF WBC: CPT

## 2021-02-09 PROCEDURE — 86870 RBC ANTIBODY IDENTIFICATION: CPT

## 2021-02-09 PROCEDURE — 6360000002 HC RX W HCPCS: Performed by: OBSTETRICS & GYNECOLOGY

## 2021-02-09 PROCEDURE — 3E033VJ INTRODUCTION OF OTHER HORMONE INTO PERIPHERAL VEIN, PERCUTANEOUS APPROACH: ICD-10-PCS | Performed by: OBSTETRICS & GYNECOLOGY

## 2021-02-09 PROCEDURE — 59050 FETAL MONITOR W/REPORT: CPT

## 2021-02-09 PROCEDURE — 3700000025 EPIDURAL BLOCK: Performed by: ANESTHESIOLOGY

## 2021-02-09 PROCEDURE — 80053 COMPREHEN METABOLIC PANEL: CPT

## 2021-02-09 PROCEDURE — 86850 RBC ANTIBODY SCREEN: CPT

## 2021-02-09 RX ORDER — ONDANSETRON 2 MG/ML
4 INJECTION INTRAMUSCULAR; INTRAVENOUS EVERY 6 HOURS PRN
Status: DISCONTINUED | OUTPATIENT
Start: 2021-02-09 | End: 2021-02-11 | Stop reason: HOSPADM

## 2021-02-09 RX ORDER — SODIUM CHLORIDE, SODIUM LACTATE, POTASSIUM CHLORIDE, CALCIUM CHLORIDE 600; 310; 30; 20 MG/100ML; MG/100ML; MG/100ML; MG/100ML
INJECTION, SOLUTION INTRAVENOUS CONTINUOUS
Status: DISCONTINUED | OUTPATIENT
Start: 2021-02-09 | End: 2021-02-11 | Stop reason: HOSPADM

## 2021-02-09 RX ORDER — SODIUM CHLORIDE, SODIUM LACTATE, POTASSIUM CHLORIDE, CALCIUM CHLORIDE 600; 310; 30; 20 MG/100ML; MG/100ML; MG/100ML; MG/100ML
500 INJECTION, SOLUTION INTRAVENOUS
Status: ACTIVE | OUTPATIENT
Start: 2021-02-09 | End: 2021-02-09

## 2021-02-09 RX ORDER — EPHEDRINE SULFATE/0.9% NACL/PF 50 MG/5 ML
10 SYRINGE (ML) INTRAVENOUS EVERY 5 MIN PRN
Status: DISCONTINUED | OUTPATIENT
Start: 2021-02-09 | End: 2021-02-11 | Stop reason: HOSPADM

## 2021-02-09 RX ORDER — NALBUPHINE HCL 10 MG/ML
10 AMPUL (ML) INJECTION
Status: DISCONTINUED | OUTPATIENT
Start: 2021-02-09 | End: 2021-02-11 | Stop reason: HOSPADM

## 2021-02-09 RX ORDER — SODIUM CHLORIDE 0.9 % (FLUSH) 0.9 %
10 SYRINGE (ML) INJECTION EVERY 12 HOURS SCHEDULED
Status: DISCONTINUED | OUTPATIENT
Start: 2021-02-09 | End: 2021-02-11 | Stop reason: HOSPADM

## 2021-02-09 RX ORDER — NALOXONE HYDROCHLORIDE 0.4 MG/ML
0.4 INJECTION, SOLUTION INTRAMUSCULAR; INTRAVENOUS; SUBCUTANEOUS PRN
Status: DISCONTINUED | OUTPATIENT
Start: 2021-02-09 | End: 2021-02-11 | Stop reason: HOSPADM

## 2021-02-09 RX ORDER — ONDANSETRON 2 MG/ML
4 INJECTION INTRAMUSCULAR; INTRAVENOUS EVERY 6 HOURS PRN
Status: DISCONTINUED | OUTPATIENT
Start: 2021-02-09 | End: 2021-02-10

## 2021-02-09 RX ORDER — BUTORPHANOL TARTRATE 1 MG/ML
1 INJECTION, SOLUTION INTRAMUSCULAR; INTRAVENOUS
Status: DISCONTINUED | OUTPATIENT
Start: 2021-02-09 | End: 2021-02-09

## 2021-02-09 RX ORDER — DOCUSATE SODIUM 100 MG/1
100 CAPSULE, LIQUID FILLED ORAL 2 TIMES DAILY
Status: DISCONTINUED | OUTPATIENT
Start: 2021-02-09 | End: 2021-02-10

## 2021-02-09 RX ORDER — SODIUM CHLORIDE 0.9 % (FLUSH) 0.9 %
10 SYRINGE (ML) INJECTION PRN
Status: DISCONTINUED | OUTPATIENT
Start: 2021-02-09 | End: 2021-02-11 | Stop reason: HOSPADM

## 2021-02-09 RX ADMIN — Medication 50 MCG: at 08:17

## 2021-02-09 RX ADMIN — Medication 25 MCG: at 12:20

## 2021-02-09 RX ADMIN — SODIUM CHLORIDE, POTASSIUM CHLORIDE, SODIUM LACTATE AND CALCIUM CHLORIDE: 600; 310; 30; 20 INJECTION, SOLUTION INTRAVENOUS at 08:18

## 2021-02-09 RX ADMIN — Medication 25 MCG: at 17:03

## 2021-02-09 RX ADMIN — SODIUM CHLORIDE, POTASSIUM CHLORIDE, SODIUM LACTATE AND CALCIUM CHLORIDE: 600; 310; 30; 20 INJECTION, SOLUTION INTRAVENOUS at 23:37

## 2021-02-09 RX ADMIN — Medication 2 MILLI-UNITS/MIN: at 21:26

## 2021-02-09 RX ADMIN — SODIUM CHLORIDE, POTASSIUM CHLORIDE, SODIUM LACTATE AND CALCIUM CHLORIDE: 600; 310; 30; 20 INJECTION, SOLUTION INTRAVENOUS at 20:38

## 2021-02-09 RX ADMIN — Medication 15 ML: at 20:57

## 2021-02-09 RX ADMIN — Medication 15 ML/HR: at 21:06

## 2021-02-09 RX ADMIN — NALBUPHINE HYDROCHLORIDE 10 MG: 10 INJECTION, SOLUTION INTRAMUSCULAR; INTRAVENOUS; SUBCUTANEOUS at 12:40

## 2021-02-09 NOTE — PROGRESS NOTES
Dr Lylia Olszewski in to see pt, ruptured membranes at 1800 with FSE and applied to head of baby, then inserted an IUPC. FSE did not stay intact so the external monitor was reapplied. Contractions picked up with IUPC. Gray catheter then inserted per request Dr Lylia Olszewski.

## 2021-02-09 NOTE — PROGRESS NOTES
40 weeks ambulatory to unit for scheduled induction of labor. Maternal perception of fetal movement present, efm applied.

## 2021-02-09 NOTE — PROGRESS NOTES
Department of Obstetrics and Gynecology  Labor and Delivery    Progress Note  Patient Name: Billy Mcdonald  YOB: 1995  MRN:    [id number]    Date: 2/9/2021      SUBJECTIVE:  admits to having contractions patient received Cytotec vaginally, patient received analgesics intravenously    OBJECTIVE:     /64   Pulse 87   Temp 98.3 °F (36.8 °C) (Oral)   Resp 16   Ht 5' 4\" (1.626 m)   Wt 178 lb (80.7 kg)   LMP 05/05/2020   BMI 30.55 kg/m²    Weeks of gestation: 40  Fetal heart rate:       Baseline Heart Rate: 140       Accelerations:  present       Long Term Variability:  moderate       Decelerations:  absent         Contraction frequency: regular, every 3-4 minutes    Membranes:  Intact AROM with difficulty clear fluid started leaking,intran is placed with difficulty    Cervix:         Dilation:  1 cm         Effacement:  50%         Station:  -2 to -3         Position:  posterior           Vaginal bleeding: absent    Results for orders placed or performed during the hospital encounter of 02/09/21   CBC auto differential   Result Value Ref Range    WBC 9.5 4.5 - 11.5 E9/L    RBC 4.08 3.50 - 5.50 E12/L    Hemoglobin 10.9 (L) 11.5 - 15.5 g/dL    Hematocrit 34.1 34.0 - 48.0 %    MCV 83.6 80.0 - 99.9 fL    MCH 26.7 26.0 - 35.0 pg    MCHC 32.0 32.0 - 34.5 %    RDW 14.0 11.5 - 15.0 fL    Platelets 955 413 - 001 E9/L    MPV 9.2 7.0 - 12.0 fL    Neutrophils % 69.7 43.0 - 80.0 %    Immature Granulocytes % 1.2 0.0 - 5.0 %    Lymphocytes % 21.2 20.0 - 42.0 %    Monocytes % 6.9 2.0 - 12.0 %    Eosinophils % 0.8 0.0 - 6.0 %    Basophils % 0.2 0.0 - 2.0 %    Neutrophils Absolute 6.61 1.80 - 7.30 E9/L    Immature Granulocytes # 0.11 E9/L    Lymphocytes Absolute 2.01 1.50 - 4.00 E9/L    Monocytes Absolute 0.65 0.10 - 0.95 E9/L    Eosinophils Absolute 0.08 0.05 - 0.50 E9/L    Basophils Absolute 0.02 0.00 - 0.20 E9/L   Comprehensive metabolic panel   Result Value Ref Range    Sodium 136 132 - 146 mmol/L Potassium 3.7 3.5 - 5.0 mmol/L    Chloride 107 98 - 107 mmol/L    CO2 17 (L) 22 - 29 mmol/L    Anion Gap 12 7 - 16 mmol/L    Glucose 88 74 - 99 mg/dL    BUN 6 6 - 20 mg/dL    CREATININE 0.7 0.5 - 1.0 mg/dL    GFR Non-African American >60 >=60 mL/min/1.73    GFR African American >60     Calcium 8.6 8.6 - 10.2 mg/dL    Total Protein 6.3 (L) 6.4 - 8.3 g/dL    Albumin 3.3 (L) 3.5 - 5.2 g/dL    Total Bilirubin 0.3 0.0 - 1.2 mg/dL    Alkaline Phosphatase 292 (H) 35 - 104 U/L    ALT 10 0 - 32 U/L    AST 16 0 - 31 U/L   Urine Drug Screen   Result Value Ref Range    Amphetamine Screen, Urine NOT DETECTED Negative <1000 ng/mL    Barbiturate Screen, Ur NOT DETECTED Negative < 200 ng/mL    Benzodiazepine Screen, Urine NOT DETECTED Negative < 200 ng/mL    Cannabinoid Scrn, Ur NOT DETECTED Negative < 50ng/mL    Cocaine Metabolite Screen, Urine NOT DETECTED Negative < 300 ng/mL    Opiate Scrn, Ur NOT DETECTED Negative < 300ng/mL    PCP Screen, Urine NOT DETECTED Negative < 25 ng/mL    Methadone Screen, Urine NOT DETECTED Negative <300 ng/mL    Oxycodone Urine NOT DETECTED Negative <100 ng/mL    FENTANYL SCREEN, URINE NOT DETECTED Negative <1 ng/mL    Drug Screen Comment: see below    COVID-19   Result Value Ref Range    SARS-CoV-2, NAAT Not Detected Not Detected   TYPE AND SCREEN   Result Value Ref Range    ABO/Rh A NEG     Antibody Screen POS    ANTIBODY IDENTIFICATION   Result Value Ref Range    Antibody ID POS, PassiveAnti-D,Patient Rec'd RHIG    DIRECT ANTIGLOBULIN TEST   Result Value Ref Range    Polyspecific Susan NEG               ASSESSMENT & PLAN: 40 wk pregnancy , induction of labor with Cytotec  Active Problems:    40 weeks gestation of pregnancy  Resolved Problems:    * No resolved hospital problems. *  Artificial rupture of membranes done for better monitoring with Intran and scalp electrode.   Attempted to place scalp electrode and continue monitoring but could not succeed we will continue with external monitors ,artificial rupture of membrane could be done with the scalp electrode. Intran placed without difficulty. Call prn if contxs,bleeding,  Will closely monitor. Gray catheter in place .       SignedElias Feliciano Díaz M.D.  2/9/2021  6:22 PM

## 2021-02-10 LAB
HCT VFR BLD CALC: 33.3 % (ref 34–48)
HEMOGLOBIN: 10.6 G/DL (ref 11.5–15.5)

## 2021-02-10 PROCEDURE — 2580000003 HC RX 258: Performed by: OBSTETRICS & GYNECOLOGY

## 2021-02-10 PROCEDURE — 7200000001 HC VAGINAL DELIVERY

## 2021-02-10 PROCEDURE — 85018 HEMOGLOBIN: CPT

## 2021-02-10 PROCEDURE — 85014 HEMATOCRIT: CPT

## 2021-02-10 PROCEDURE — 51702 INSERT TEMP BLADDER CATH: CPT

## 2021-02-10 PROCEDURE — 36415 COLL VENOUS BLD VENIPUNCTURE: CPT

## 2021-02-10 PROCEDURE — 6370000000 HC RX 637 (ALT 250 FOR IP): Performed by: OBSTETRICS & GYNECOLOGY

## 2021-02-10 PROCEDURE — 1220000001 HC SEMI PRIVATE L&D R&B

## 2021-02-10 PROCEDURE — 6360000002 HC RX W HCPCS: Performed by: OBSTETRICS & GYNECOLOGY

## 2021-02-10 RX ORDER — DOCUSATE SODIUM 100 MG/1
100 CAPSULE, LIQUID FILLED ORAL 2 TIMES DAILY
Status: DISCONTINUED | OUTPATIENT
Start: 2021-02-10 | End: 2021-02-11 | Stop reason: HOSPADM

## 2021-02-10 RX ORDER — KETOROLAC TROMETHAMINE 30 MG/ML
30 INJECTION, SOLUTION INTRAMUSCULAR; INTRAVENOUS EVERY 6 HOURS PRN
Status: COMPLETED | OUTPATIENT
Start: 2021-02-10 | End: 2021-02-10

## 2021-02-10 RX ORDER — OXYCODONE HYDROCHLORIDE AND ACETAMINOPHEN 5; 325 MG/1; MG/1
2 TABLET ORAL EVERY 4 HOURS PRN
Status: DISCONTINUED | OUTPATIENT
Start: 2021-02-10 | End: 2021-02-11 | Stop reason: HOSPADM

## 2021-02-10 RX ORDER — MODIFIED LANOLIN
OINTMENT (GRAM) TOPICAL PRN
Status: DISCONTINUED | OUTPATIENT
Start: 2021-02-10 | End: 2021-02-11 | Stop reason: HOSPADM

## 2021-02-10 RX ORDER — FERROUS SULFATE 325(65) MG
325 TABLET ORAL 2 TIMES DAILY WITH MEALS
Status: DISCONTINUED | OUTPATIENT
Start: 2021-02-10 | End: 2021-02-11 | Stop reason: HOSPADM

## 2021-02-10 RX ORDER — ACETAMINOPHEN 325 MG/1
650 TABLET ORAL EVERY 4 HOURS PRN
Status: DISCONTINUED | OUTPATIENT
Start: 2021-02-10 | End: 2021-02-11 | Stop reason: HOSPADM

## 2021-02-10 RX ORDER — OXYCODONE HYDROCHLORIDE AND ACETAMINOPHEN 5; 325 MG/1; MG/1
1 TABLET ORAL EVERY 4 HOURS PRN
Status: DISCONTINUED | OUTPATIENT
Start: 2021-02-10 | End: 2021-02-11 | Stop reason: HOSPADM

## 2021-02-10 RX ORDER — IBUPROFEN 800 MG/1
800 TABLET ORAL EVERY 6 HOURS PRN
Status: DISCONTINUED | OUTPATIENT
Start: 2021-02-10 | End: 2021-02-11 | Stop reason: HOSPADM

## 2021-02-10 RX ORDER — METHYLERGONOVINE MALEATE 0.2 MG/ML
200 INJECTION INTRAVENOUS PRN
Status: DISCONTINUED | OUTPATIENT
Start: 2021-02-10 | End: 2021-02-11 | Stop reason: HOSPADM

## 2021-02-10 RX ORDER — CHLORHEXIDINE GLUCONATE 4 G/100ML
SOLUTION TOPICAL
Status: DISPENSED
Start: 2021-02-10 | End: 2021-02-10

## 2021-02-10 RX ORDER — DOCUSATE SODIUM 100 MG/1
100 CAPSULE, LIQUID FILLED ORAL DAILY
Status: DISCONTINUED | OUTPATIENT
Start: 2021-02-10 | End: 2021-02-10

## 2021-02-10 RX ADMIN — WITCH HAZEL 40 EACH: 500 SOLUTION RECTAL; TOPICAL at 02:36

## 2021-02-10 RX ADMIN — KETOROLAC TROMETHAMINE 30 MG: 30 INJECTION, SOLUTION INTRAMUSCULAR at 02:36

## 2021-02-10 RX ADMIN — BENZOCAINE AND LEVOMENTHOL: 200; 5 SPRAY TOPICAL at 02:36

## 2021-02-10 RX ADMIN — OXYCODONE AND ACETAMINOPHEN 1 TABLET: 5; 325 TABLET ORAL at 12:53

## 2021-02-10 RX ADMIN — DOCUSATE SODIUM 100 MG: 100 CAPSULE, LIQUID FILLED ORAL at 21:08

## 2021-02-10 RX ADMIN — ACETAMINOPHEN 650 MG: 325 TABLET, FILM COATED ORAL at 09:39

## 2021-02-10 RX ADMIN — SODIUM CHLORIDE, PRESERVATIVE FREE 10 ML: 5 INJECTION INTRAVENOUS at 02:36

## 2021-02-10 RX ADMIN — DOCUSATE SODIUM 100 MG: 100 CAPSULE, LIQUID FILLED ORAL at 08:20

## 2021-02-10 RX ADMIN — IBUPROFEN 800 MG: 800 TABLET, FILM COATED ORAL at 20:11

## 2021-02-10 ASSESSMENT — PAIN DESCRIPTION - DESCRIPTORS: DESCRIPTORS: CRAMPING

## 2021-02-10 ASSESSMENT — PAIN SCALES - GENERAL
PAINLEVEL_OUTOF10: 3
PAINLEVEL_OUTOF10: 6
PAINLEVEL_OUTOF10: 6

## 2021-02-10 NOTE — PROGRESS NOTES
Fetal heart rate and uterine activity being monitored continuously by RN while pt pushing. Coleman Varma

## 2021-02-10 NOTE — PROGRESS NOTES
Dr Mahnaz Jarvis notified pt is complete, -1 station, had some bloody show, feels some pressure, and had a few more late decels.  states to start pushing, he will head in.

## 2021-02-10 NOTE — ANESTHESIA PROCEDURE NOTES
Epidural Block    Patient location during procedure: OB  Start time: 2/9/2021 8:42 PM  End time: 2/9/2021 9:06 PM  Reason for block: labor epidural  Staffing  Performed: resident/CRNA   Anesthesiologist: Aroldo Barragan MD  Resident/CRNA: ANTONIO Williamson CRNA  Preanesthetic Checklist  Completed: patient identified, IV checked, site marked, risks and benefits discussed, surgical consent, monitors and equipment checked, pre-op evaluation, timeout performed, anesthesia consent given, oxygen available and patient being monitored  Epidural  Patient position: sitting  Prep: Betadine and site prepped and draped  Patient monitoring: cardiac monitor, continuous pulse ox and frequent blood pressure checks  Approach: midline  Location: lumbar (1-5)  Injection technique: KAEL air and KAEL saline  Provider prep: mask and sterile gloves  Needle  Needle type: Tuohy   Needle gauge: 18 G  Needle length: 2.5 in  Needle insertion depth: 6 cm  Catheter type: end hole  Catheter size: 20 G.   Catheter at skin depth: 10 cm  Test dose: negative  Assessment  Sensory level: T4  Hemodynamics: stable  Attempts: 1

## 2021-02-10 NOTE — PROGRESS NOTES
Bedside report received from previous RN and care assumed. Patient assessed and plan of care discussed. Leg bag draining. Maintaining ice to perineum. Verbalized understanding, no questions voiced when asked.

## 2021-02-10 NOTE — PROGRESS NOTES
Dr Karmen Avilez at bedside, visualized fetal tracing. Helping patient to push. Orders received to prepare for delivery.

## 2021-02-10 NOTE — L&D DELIVERY NOTE
Department of Obstetrics and Gynecology  Spontaneous Vaginal Delivery Note  Will Arias VSQJLL,[id number]    Pre-operative Diagnosis: Term pregnancy for induction of labor with Cytotec/Pitocin GBS negative    Post-operative Diagnosis:  Living  infant(s) and Male    Information for the patient's :  Yanick Jeffrey [[id number]]             Male infant       Labor & Delivery Summary  Dilation Complete Date: 21  Dilation Complete Time: 2247    Infant Wt:   Information for the patient's :  Roslyn Vázquez Cleaves [[id number]]      8 pound 10 ounce    APGARS:     Information for the patient's :  Yanick Jeffrey [[id number]]      9 at 1 minute 9 at 5 minutes    Cord gases arterial pH 7.15 base excess -8.1 and urinalysis 7.27 base excess -7.9. Anesthesia:  epidural anesthesia    Application and Delivery:  Second degree episiotomy. Suture used for repair:  Vicryl 3.0. Delivery Summary:  Labor & Delivery Summary  Dilation Complete Date: 21  Dilation Complete Time: 6775     Patient is admitted to [facility] and placed on external monitors. IV fluids started Cytotec 50 mcg placed intravaginally followed by 25 mcg every 4 hours intravaginally contractions are regular,FHT reactive with moderate variability without decels. Pt received analgesics IV and  epidural anesthesia. Progress of labor as anticipated slow but steady and now fully dilated cervix. With subsequent contractions she started pushing and delivered vaginally spontaneously without any complications with Second degree episiotomy. Suture used for repair:  Vicryl 3.0.,Placenta and cord and membranes delivered spontaneously. Pt is given pitocin in IV fluids. .Second degree episiotomy. Suture used for repair:  Vicryl 3.0. repaired in standard manner. Vaginal walls,cervix,perineum,rectum intact. Uterus is well contracted. Pt is watched for next 1 hour. mother and baby both are  stable and doing well. Routine postpartum care    Specimen: Placenta sent to pathology No    Estimated blood loss: 300 cc             Condition:  infant stable to general nursery and mother stable               Infant Feeding:    breast    Ho Díaz M.D. 2/10/2021 12:37 AM    Padmini Fontana

## 2021-02-10 NOTE — PROGRESS NOTES
Epidural bolused at 2057, catheter inserted at 2058 and test dose at 2059. Pt lying down again at 2103.

## 2021-02-10 NOTE — PROGRESS NOTES
Dr Jessi Nguyen notified that pt's contractions are usually 2 to 4 minutes apart, but on occasion are 1 to 5 min apart. Also notified that [address] have been reassuring, just had variables with last two contractions down to 120. Dr salazars pitocin started at 2 milliunits per minute and increased per induction protocol until MTVs are 230 per 10 minutes.

## 2021-02-10 NOTE — PROGRESS NOTES
Dr Eva Boone updated. Notified that pt is 3 cm, asking for epidural, and about contractions. Also notified that pitocin will be started after 4 hours post Cytotec insertion.  Dr states ok, not to start it until 9 or 9:30 pm.

## 2021-02-10 NOTE — ANESTHESIA PRE PROCEDURE
Department of Anesthesiology  Preprocedure Note       Name:  Esha Lopez   Age:  30 y.o.  :  1995                                          MRN:  [id number]         Date:  2021      Surgeon: Dr. Ira Horton    Procedure: Labor Epidural    Medications prior to admission:   Prior to Admission medications    Medication Sig Start Date End Date Taking?  Authorizing Provider   Prenatal Multivit-Min-Fe-FA (PRE-DAHIANA FORMULA PO) Take 1 tablet by mouth daily -20   Yes Historical Provider, MD       Current medications:    Current Facility-Administered Medications   Medication Dose Route Frequency Provider Last Rate Last Admin    lactated ringers infusion   Intravenous Continuous Faythe Baumgarten,  mL/hr at 21 Rate Change at 21    sodium chloride flush 0.9 % injection 10 mL  10 mL Intravenous 2 times per day Faythe Baumgarten, MD        sodium chloride flush 0.9 % injection 10 mL  10 mL Intravenous PRN Faythe Baumgarten, MD        oxytocin (PITOCIN) 30 units in 500 mL infusion  909 alex-units/min Intravenous PRN Faythe Baumgarten, MD        And    oxytocin (PITOCIN) 30 units in 500 mL infusion  87.3 alex-units/min Intravenous PRN Faythe Baumgarten, MD        ondansetron [facility]) injection 4 mg  4 mg Intravenous Q6H PRN Faythe Baumgarten, MD        docusate sodium (COLACE) capsule 100 mg  100 mg Oral BID Faythe Baumgarten, MD        miSOPROStol (CYTOTEC) pre-split tablet TABS 25 mcg  25 mcg Vaginal Q4H Faythe Baumgarten, MD   25 mcg at 21 1703    oxytocin (PITOCIN) 30 units in 500 mL infusion  1-20 alex-units/min Intravenous Continuous Faythe Baumgarten, MD        nalbuphine (NUBAIN) injection 10 mg  10 mg Intravenous Q3H PRN Faythe Baumgarten, MD   10 mg at 21 1240    naloxone (NARCAN) injection 0.4 mg  0.4 mg Intravenous PRN Paula Cheng MD        ondansetron [facility]) injection 4 mg  4 mg Intravenous Q6H PRN Paula Shack, MD        fentaNYL 1.85mcg/ml and bupivacaine 0.1% 15ml syringe (Home Care) (OB) 15 mL epidural  15 mL Epidural Once Heidi Iniguez MD        fentaNYL 1.85mcg/ml and Bupivicaine 0.1% in 0.9% NS 135ml infusion (OB) epidural  15 mL/hr Epidural Continuous Heidi Iniguez MD        ePHEDrine injection 10 mg  10 mg Intravenous Q5 Min PRN Heidi Iniguez MD        lactated ringers infusion 500 mL  500 mL Intravenous Once PRN Heidi Iniguez MD        lactated ringers infusion 500 mL  500 mL Intravenous Once PRN Heidi Iniguez MD         Facility-Administered Medications Ordered in Other Encounters   Medication Dose Route Frequency Provider Last Rate Last Admin    oxytocin (PITOCIN) 30 units in 500 mL infusion  1 alex-units/min Intravenous Continuous Moon Frye MD           Allergies: Allergies   Allergen Reactions    Sulfa Antibiotics Hives     Hives as a child       Problem List:    Patient Active Problem List   Diagnosis Code    Complicated pregnancy, third trimester O26.93    Abdominal cramping complicating pregnancy W75.482, R10.9    40 weeks gestation of pregnancy Z3A.40       Past Medical History:        Diagnosis Date    Missed ab     for OR 2-28-20     Need for rhogam due to Rh negative mother 2016       Past Surgical History:        Procedure Laterality Date    ADENOIDECTOMY Bilateral 2002    DILATION AND CURETTAGE OF UTERUS N/A 2/28/2020    DILATATION AND CURETTAGE SUCTION performed by Moon Frye MD at [address] [place] [address]         Social History:    Social History     Tobacco Use    Smoking status: Never Smoker    Smokeless tobacco: Never Used   Substance Use Topics    Alcohol use:  No                                Counseling given: Not Answered      Vital Signs (Current):   Vitals:    02/09/21 1817 02/09/21 1847 02/09/21 1919 02/09/21 1957   BP: 134/68 136/81 (!) 141/76 131/80   Pulse: 104 94 86 120   Resp:       Temp:       TempSrc:       Weight:       Height: ROS            Rhythm: regular  Rate: normal                    Neuro/Psych:   Negative Neuro/Psych ROS              GI/Hepatic/Renal: Neg GI/Hepatic/Renal ROS            Endo/Other: Negative Endo/Other ROS                    Abdominal:   (+) obese,         Vascular: negative vascular ROS. Anesthesia Plan      epidural     ASA 2             Anesthetic plan and risks discussed with patient. Plan discussed with attending.     Attending anesthesiologist reviewed and agrees with Pre Eval content              Kristine Suresh, ANTONIO - CRNA   2/9/2021

## 2021-02-11 VITALS
TEMPERATURE: 98.2 F | SYSTOLIC BLOOD PRESSURE: 120 MMHG | DIASTOLIC BLOOD PRESSURE: 75 MMHG | HEIGHT: 64 IN | BODY MASS INDEX: 30.39 KG/M2 | RESPIRATION RATE: 16 BRPM | HEART RATE: 92 BPM | OXYGEN SATURATION: 100 % | WEIGHT: 178 LBS

## 2021-02-11 PROCEDURE — 6370000000 HC RX 637 (ALT 250 FOR IP): Performed by: OBSTETRICS & GYNECOLOGY

## 2021-02-11 RX ORDER — IBUPROFEN 800 MG/1
800 TABLET ORAL EVERY 8 HOURS PRN
Qty: 21 TABLET | Refills: 1 | Status: ON HOLD | OUTPATIENT
Start: 2021-02-11 | End: 2022-10-11

## 2021-02-11 RX ORDER — PSEUDOEPHEDRINE HCL 30 MG
100 TABLET ORAL DAILY
Qty: 15 CAPSULE | Refills: 1 | Status: ON HOLD | OUTPATIENT
Start: 2021-02-11 | End: 2022-10-11

## 2021-02-11 RX ADMIN — BENZOCAINE AND LEVOMENTHOL: 200; 5 SPRAY TOPICAL at 17:17

## 2021-02-11 RX ADMIN — DOCUSATE SODIUM 100 MG: 100 CAPSULE, LIQUID FILLED ORAL at 09:48

## 2021-02-11 RX ADMIN — WITCH HAZEL 40 EACH: 500 SOLUTION RECTAL; TOPICAL at 17:17

## 2021-02-11 NOTE — DISCHARGE SUMMARY
Patient ID:  Hank Campos  [id number]  3 y.o.  1995         Discharge Summary      Admit date: 2021    Discharge date and time: 2021    Admitting Physician: NANDINI Patton FACOG     Diagnoses: 40 weeks gestation of pregnancy [Z3A.40] Cytotec/Pitocin induction, vaginal delivery of male infant with weight of 3912 gms with Apgars 9 at 1 minute 9 at 5 minutes    Discharged Condition: good    Indication for Admission: 30 y.o. y.o. [id number] admitted at Term pregnancy in :\"Induced\" labor with Cytotec/Pitocin without complications    Procedures Performed: Labor & Delivery Summary  Dilation Complete Date: 21  Dilation Complete Time:     male      Information for the patient's :  Jaspreet Ladd [[id number]]      . apgars   Information for the patient's :  Jaspreet Ladd [[id number]]          . Hospital Course: Patient was admitted on the day  and underwent an uncomplicated procedure. Pt received analgesics IV and /or epidural anesthesia and delivered without comps. postpartum care was uncomplicated. H/H stable,Vital stable,,Uterus nontender,perineum healing well/ incision and wound dry no bleeding or oozing, Moderate lochia,voided without probs and passed flatus. Discharge Exam:  /66   Pulse 72   Temp 98.5 °F (36.9 °C) (Oral)   Resp 15   Ht 5' 4\" (1.626 m)   Wt 178 lb (80.7 kg)   LMP 2020   SpO2 100%   Breastfeeding Unknown   BMI 30.55 kg/m²   General appearance: alert, appears stated age and cooperative  Back: symmetric, no curvature. ROM normal. No CVA tenderness. Abdomen: soft, non-tender; bowel sounds normal; no masses,  no organomegaly uterus well contracted moderate lochia  Extremities: extremities normal, atraumatic, no cyanosis or edema    Disposition: home    Patient Instructions:    Activity: activity as tolerated  Diet: regular diet  Wound Care:     Discharge Medication:    Newton Rinne   Home Medication Instructions OJW:[id number]    Printed on:2129   Medication Information                      docusate sodium (COLACE, DULCOLAX) 100 MG CAPS  Take 100 mg by mouth daily             ibuprofen (ADVIL;MOTRIN) 800 MG tablet  Take 1 tablet by mouth every 8 hours as needed for Pain             Prenatal Multivit-Min-Fe-FA (PRE-DAHIANA FORMULA PO)  Take 1 tablet by mouth daily LD 2--20                  Follow-up with Jaylyn Díaz M.D. in 4 weeks.     Miranda Díaz M.D.  2021  8:29 AM

## 2021-02-11 NOTE — PROGRESS NOTES
Discharge instructions given. Verbalizes understanding. Patient also informed that Dr Jackie Wu will call in an antibiotic to her personal pharmacist. Paola Otoole discount\" Keflex New follow up plans have patient followup in 2 weeks in office.

## 2021-02-11 NOTE — PROGRESS NOTES
Subjective:     Postpartum Day 1:   The patient feels well. The patient denies emotional concerns. Pain is well controlled with current medications. The baby iswell. Baby is feeding via breast. Urinary output is adequate. The patient is ambulating well. The patient is tolerating a normal diet. Flatus has been passed. Objective:        Blood pressure 120/66, pulse 72, temperature 98.5 °F (36.9 °C), temperature source Oral, resp. rate 15, height 5' 4\" (1.626 m), weight 178 lb (80.7 kg), last menstrual period 05/05/2020, SpO2 100 %, unknown if currently breastfeeding. No intake/output data recorded. Lab Results   Component Value Date    WBC 9.5 02/09/2021    HGB 10.6 (L) 02/10/2021    HCT 33.3 (L) 02/10/2021    MCV 83.6 02/09/2021     02/09/2021       General:    alert, appears stated age and cooperative   Lungs:    Lochia:  appropriate   Uterine    firmnontender,epi healing well   Back         no pain to palpation   DVT Evaluation:  No evidence of DVT seen on physical exam.  Negative Justin's sign.   @ LABS@    Assessment:     Postpartum day 1 doing well.male  Active Problems:    40 weeks gestation of pregnancy  Resolved Problems:    * No resolved hospital problems. *    Plan:     Discharge home with standard precautions and return to clinic in 4- weeks. Prenatal vit daily,  Pain meds as needed, routine postpartum care      Bobo Díaz M.D. 2/11/2021 8:23 AM    Jia Norman  [phone]

## 2021-02-11 NOTE — ANESTHESIA POSTPROCEDURE EVALUATION
Department of Anesthesiology  Postprocedure Note    Patient: Aki Kelly  MRN: [id number]  YOB: 1995  Date of evaluation: 2/10/2021  Time:  8:31 PM     Procedure Summary     Date: 02/09/21 Room / Location:     Anesthesia Start: 2042 Anesthesia Stop: 02/10/21 0006    Procedure: Labor Analgesia Diagnosis:     Scheduled Providers:  Responsible Provider: Deloris Mckeon MD    Anesthesia Type: epidural ASA Status: 2          Anesthesia Type: epidural    Stephany Phase I: Stephany Score: 10    Stephany Phase II:      Last vitals: Reviewed and per EMR flowsheets.        Anesthesia Post Evaluation    Patient location during evaluation: PACU  Patient participation: complete - patient participated  Level of consciousness: awake  Airway patency: patent  Nausea & Vomiting: no nausea and no vomiting  Complications: no  Cardiovascular status: hemodynamically stable  Respiratory status: acceptable  Hydration status: stable

## 2021-02-11 NOTE — H&P
Department of Obstetrics and Gynecology   Obstetrics History and Physical      Leola Ziegler  2021  [id number]    CHIEF COMPLAINT: Induction of labor    HISTORY OF PRESENT ILLNESS:      The patient is a 30 y.o. female  at 40w1d.   Admitted for induction of labor with Cytotec/Pitocin, GBS is negative  OB History        3    Para   2    Term   2       0    AB   1    Living   2       SAB   1    TAB   0    Ectopic   0    Molar   0    Multiple   0    Live Births   2            Patient presents with a chief complaint as above and is being admitted for *induction of labor*    Estimated Due Date: Estimated Date of Delivery: 21    PRENATAL CARE:    Complicated by:   Patient Active Problem List   Diagnosis Code    Complicated pregnancy, third trimester O26.93    Abdominal cramping complicating pregnancy G26.909, R10.9    40 weeks gestation of pregnancy Z3A.40       PAST OB HISTORY  OB History        3    Para   2    Term   2       0    AB   1    Living   2       SAB   1    TAB   0    Ectopic   0    Molar   0    Multiple   0    Live Births   2                Past Medical History:        Diagnosis Date    Missed ab     for OR 20     Need for rhogam due to Rh negative mother      Past Surgical History:        Procedure Laterality Date    ADENOIDECTOMY Bilateral     DILATION AND CURETTAGE OF UTERUS N/A 2020    DILATATION AND CURETTAGE SUCTION performed by Vilma Murray MD at [address] [facility] EXTRACTION       Allergies:  Sulfa antibiotics  Social History:    Social History     Socioeconomic History    Marital status: Single     Spouse name: Not on file    Number of children: Not on file    Years of education: Not on file    Highest education level: Not on file   Occupational History    Not on file   Social Needs    Financial resource strain: Not on file    Food insecurity     Worry: Not on file     Inability: Not on file   [employer] needs     Medical: Not on file     Non-medical: Not on file   Tobacco Use    Smoking status: Never Smoker    Smokeless tobacco: Never Used   Substance and Sexual Activity    Alcohol use: No    Drug use: No    Sexual activity: Yes     Partners: Male   Lifestyle    Physical activity     Days per week: Not on file     Minutes per session: Not on file    Stress: Not on file   Relationships    Social connections     Talks on phone: Not on file     Gets together: Not on file     Attends Faith service: Not on file     Active member of club or organization: Not on file     Attends meetings of clubs or organizations: Not on file     Relationship status: Not on file    Intimate partner violence     Fear of current or ex partner: Not on file     Emotionally abused: Not on file     Physically abused: Not on file     Forced sexual activity: Not on file   Other Topics Concern    Not on file   Social History Narrative    Not on file     Family History:   History reviewed. No pertinent family history. Medications Prior to Admission:  Medications Prior to Admission: Prenatal Multivit-Min-Fe-FA (PRE- FORMULA PO), Take 1 tablet by mouth daily LD 20    REVIEW OF SYSTEMS:    CONSTITUTIONAL:  negative  RESPIRATORY:  negative  CARDIOVASCULAR:  negative  GASTROINTESTINAL:  Negative  GENITOURINARY: Negative  ALLERGIC/IMMUNOLOGIC:  negative  NEUROLOGICAL:  negative  BEHAVIOR/PSYCH:  negative    PHYSICAL EXAM:  Blood pressure 120/66, pulse 72, temperature 98.5 °F (36.9 °C), temperature source Oral, resp. rate 15, height 5' 4\" (1.626 m), weight 178 lb (80.7 kg), last menstrual period 2020, SpO2 100 %, unknown if currently breastfeeding. General appearance:  awake, alert, cooperative, no apparent distress, and appears stated age  Abdomen:  Gravid  uterus, consistent with her gestational age 44w3d, irregularNo uterine contractions. , CVA tenderness No      Fetal heart rate:  Reassuring.  140,with accels and moderate variability,no decels,  Pelvis:  Adequate pelvis  Cervix: soft   1 cm   50%    -3  Presentation: CEPHALIC  Membranes:  intact  Extremities: nontender bilaterally,edema1+    DATA:  Labs:  CBC:   Lab Results   Component Value Date    WBC 9.5 02/09/2021    RBC 4.08 02/09/2021    HGB 10.6 02/10/2021    HCT 33.3 02/10/2021    MCV 83.6 02/09/2021    RDW 14.0 02/09/2021     02/09/2021     CMP:    Lab Results   Component Value Date     02/09/2021    K 3.7 02/09/2021     02/09/2021    CO2 17 02/09/2021    BUN 6 02/09/2021    PROT 6.3 02/09/2021     U/A:  No components found for: Adine Eugene, USPGRAV, UPH, UPROTEIN, UGLUCOSE, UKETONE, UBILI, UBLOOD, [place], UUROBIL, [place], [facility], [place], [place], [place], [facility], [place]  TSH:    Lab Results   Component Value Date    TSH 2.090 07/21/2017     RPR:  No results found for: RPR  RUBELLA: No results found for: RUBELLAIGG  HEPBSAG:   Lab Results   Component Value Date    HBSAGI Non-Reactive 11/11/2015     HIV:  No results found for: HIV  HgBA1c:  No components found for: HGBA1C  Blood Type:  No results found for: ABOINT  RH:  No results found for: ANATITER, C3, C4, RF  Antibody Screen:  No components found for: ABSCINT  Gonorrhea:  No components found for: PRBCHLGC  Chlamydia:  No components found for: CCHLAM  gbs-STREP B SCREEN: No results found for: GBSAG    No orders to display       Results for orders placed or performed during the hospital encounter of 02/09/21   CBC auto differential   Result Value Ref Range    WBC 9.5 4.5 - 11.5 E9/L    RBC 4.08 3.50 - 5.50 E12/L    Hemoglobin 10.9 (L) 11.5 - 15.5 g/dL    Hematocrit 34.1 34.0 - 48.0 %    MCV 83.6 80.0 - 99.9 fL    MCH 26.7 26.0 - 35.0 pg    MCHC 32.0 32.0 - 34.5 %    RDW 14.0 11.5 - 15.0 fL    Platelets 139 389 - 248 E9/L    MPV 9.2 7.0 - 12.0 fL    Neutrophils % 69.7 43.0 - 80.0 %    Immature Granulocytes % 1.2 0.0 - 5.0 %    Lymphocytes % 21.2 20.0 - 42.0 %    Monocytes % 6.9 2.0 - 12.0 %    Eosinophils % 0.8 0.0 - 6.0 %    Basophils % 0.2 0.0 - 2.0 %    Neutrophils Absolute 6.61 1.80 - 7.30 E9/L    Immature Granulocytes # 0.11 E9/L    Lymphocytes Absolute 2.01 1.50 - 4.00 E9/L    Monocytes Absolute 0.65 0.10 - 0.95 E9/L    Eosinophils Absolute 0.08 0.05 - 0.50 E9/L    Basophils Absolute 0.02 0.00 - 0.20 E9/L   Comprehensive metabolic panel   Result Value Ref Range    Sodium 136 132 - 146 mmol/L    Potassium 3.7 3.5 - 5.0 mmol/L    Chloride 107 98 - 107 mmol/L    CO2 17 (L) 22 - 29 mmol/L    Anion Gap 12 7 - 16 mmol/L    Glucose 88 74 - 99 mg/dL    BUN 6 6 - 20 mg/dL    CREATININE 0.7 0.5 - 1.0 mg/dL    GFR Non-African American >60 >=60 mL/min/1.73    GFR African American >60     Calcium 8.6 8.6 - 10.2 mg/dL    Total Protein 6.3 (L) 6.4 - 8.3 g/dL    Albumin 3.3 (L) 3.5 - 5.2 g/dL    Total Bilirubin 0.3 0.0 - 1.2 mg/dL    Alkaline Phosphatase 292 (H) 35 - 104 U/L    ALT 10 0 - 32 U/L    AST 16 0 - 31 U/L   Urine Drug Screen   Result Value Ref Range    Amphetamine Screen, Urine NOT DETECTED Negative <1000 ng/mL    Barbiturate Screen, Ur NOT DETECTED Negative < 200 ng/mL    Benzodiazepine Screen, Urine NOT DETECTED Negative < 200 ng/mL    Cannabinoid Scrn, Ur NOT DETECTED Negative < 50ng/mL    Cocaine Metabolite Screen, Urine NOT DETECTED Negative < 300 ng/mL    Opiate Scrn, Ur NOT DETECTED Negative < 300ng/mL    PCP Screen, Urine NOT DETECTED Negative < 25 ng/mL    Methadone Screen, Urine NOT DETECTED Negative <300 ng/mL    Oxycodone Urine NOT DETECTED Negative <100 ng/mL    FENTANYL SCREEN, URINE NOT DETECTED Negative <1 ng/mL    Drug Screen Comment: see below    COVID-19   Result Value Ref Range    SARS-CoV-2, NAAT Not Detected Not Detected   Hemoglobin and hematocrit, blood   Result Value Ref Range    Hemoglobin 10.6 (L) 11.5 - 15.5 g/dL    Hematocrit 33.3 (L) 34.0 - 48.0 %   TYPE AND SCREEN   Result Value Ref Range    ABO/Rh A NEG     Antibody Screen POS    ANTIBODY IDENTIFICATION   Result Value Ref Range    Antibody ID POS, PassiveAnti-D,Patient Rec'd RHIG    DIRECT ANTIGLOBULIN TEST   Result Value Ref Range    Polyspecific Susan NEG        ASSESSMENT AND PLAN:full term pregnancy, induction of labor with Cytotec/Pitocin  Active Problems:    40 weeks gestation of pregnancy  Resolved Problems:    * No resolved hospital problems.  *    50 mcg of Cytotec placed intravaginally and then 25 mcg Cytotec every 4 hours intravaginally as needed continuous monitoring, and reassess periodically  Labor: OBSERVATION, anticipate normal delivery, routine labor orders  Fetus: Reassuring  GBS: Negative  IVFluids,labs,analgesics/epidural as needed        Electronically signed by Jason Eid MD on 2/11/2021 at 8:48 AM

## 2021-02-12 NOTE — PROGRESS NOTES
CLINICAL PHARMACY NOTE: MEDS TO [address] [place] [address] Drive Select Patient?: No  Total # of Prescriptions Filled: 2   The following medications were delivered to the patient:  · Docusate sodium 100 mg  · Ibuprofen 800 mg  Total # of Interventions Completed: 2  Time Spent (min): 30    Additional Documentation:

## 2022-08-20 ENCOUNTER — HOSPITAL ENCOUNTER (OUTPATIENT)
Age: 27
Discharge: HOME | End: 2022-08-20
Payer: COMMERCIAL

## 2022-08-20 LAB
ABO/RH: NORMAL
ANTIBODY SCREEN: NORMAL
BASOPHILS ABSOLUTE: 0.02 E9/L (ref 0–0.2)
BASOPHILS RELATIVE PERCENT: 0.2 % (ref 0–2)
EOSINOPHILS ABSOLUTE: 0.06 E9/L (ref 0.05–0.5)
EOSINOPHILS RELATIVE PERCENT: 0.7 % (ref 0–6)
HCT VFR BLD CALC: 35.2 % (ref 34–48)
HEMOGLOBIN: 11.3 G/DL (ref 11.5–15.5)
IMMATURE GRANULOCYTES #: 0.06 E9/L
IMMATURE GRANULOCYTES %: 0.7 % (ref 0–5)
LYMPHOCYTES ABSOLUTE: 1.87 E9/L (ref 1.5–4)
LYMPHOCYTES RELATIVE PERCENT: 21.8 % (ref 20–42)
MCH RBC QN AUTO: 29 PG (ref 26–35)
MCHC RBC AUTO-ENTMCNC: 32.1 % (ref 32–34.5)
MCV RBC AUTO: 90.3 FL (ref 80–99.9)
MONOCYTES ABSOLUTE: 0.47 E9/L (ref 0.1–0.95)
MONOCYTES RELATIVE PERCENT: 5.5 % (ref 2–12)
NEUTROPHILS ABSOLUTE: 6.11 E9/L (ref 1.8–7.3)
NEUTROPHILS RELATIVE PERCENT: 71.1 % (ref 43–80)
PDW BLD-RTO: 12.7 FL (ref 11.5–15)
PLATELET # BLD: 238 E9/L (ref 130–450)
PMV BLD AUTO: 9 FL (ref 7–12)
RBC # BLD: 3.9 E12/L (ref 3.5–5.5)
RHIG LOT NUMBER: NORMAL
WBC # BLD: 8.6 E9/L (ref 4.5–11.5)

## 2022-08-20 PROCEDURE — 86850 RBC ANTIBODY SCREEN: CPT

## 2022-08-20 PROCEDURE — 6360000002 HC RX W HCPCS: Performed by: OBSTETRICS & GYNECOLOGY

## 2022-08-20 PROCEDURE — 85025 COMPLETE CBC W/AUTO DIFF WBC: CPT

## 2022-08-20 PROCEDURE — 86900 BLOOD TYPING SEROLOGIC ABO: CPT

## 2022-08-20 PROCEDURE — 96372 THER/PROPH/DIAG INJ SC/IM: CPT

## 2022-08-20 PROCEDURE — 86901 BLOOD TYPING SEROLOGIC RH(D): CPT

## 2022-08-20 PROCEDURE — 36415 COLL VENOUS BLD VENIPUNCTURE: CPT

## 2022-08-20 RX ADMIN — HUMAN RHO(D) IMMUNE GLOBULIN 300 MCG: 300 INJECTION, SOLUTION INTRAMUSCULAR at 12:44

## 2022-10-09 ENCOUNTER — HOSPITAL ENCOUNTER (OUTPATIENT)
Age: 27
Discharge: HOME | End: 2022-10-09
Attending: OBSTETRICS & GYNECOLOGY | Admitting: OBSTETRICS & GYNECOLOGY
Payer: COMMERCIAL

## 2022-10-09 VITALS
DIASTOLIC BLOOD PRESSURE: 71 MMHG | HEART RATE: 139 BPM | TEMPERATURE: 97.9 F | SYSTOLIC BLOOD PRESSURE: 125 MMHG | RESPIRATION RATE: 16 BRPM

## 2022-10-09 PROBLEM — Z34.93 NORMAL PREGNANCY IN THIRD TRIMESTER: Status: ACTIVE | Noted: 2022-10-09

## 2022-10-09 LAB
BACTERIA: ABNORMAL /HPF
BILIRUBIN URINE: NEGATIVE
BLOOD, URINE: NEGATIVE
CLARITY: CLEAR
COLOR: YELLOW
EPITHELIAL CELLS, UA: ABNORMAL /HPF
GLUCOSE URINE: NEGATIVE MG/DL
KETONES, URINE: NEGATIVE MG/DL
LEUKOCYTE ESTERASE, URINE: ABNORMAL
NITRITE, URINE: NEGATIVE
PH UA: 7 (ref 5–9)
PROTEIN UA: NEGATIVE MG/DL
RBC UA: ABNORMAL /HPF (ref 0–2)
SPECIFIC GRAVITY UA: 1.01 (ref 1–1.03)
UROBILINOGEN, URINE: 0.2 E.U./DL
WBC UA: ABNORMAL /HPF (ref 0–5)
YEAST: PRESENT /HPF

## 2022-10-09 PROCEDURE — 81001 URINALYSIS AUTO W/SCOPE: CPT

## 2022-10-09 PROCEDURE — 99202 OFFICE O/P NEW SF 15 MIN: CPT

## 2022-10-09 NOTE — H&P
Department of Obstetrics and Gynecology   Obstetrics History and Physical      Rhianna Es ZACKARY MILLAN [facility]  10/9/2022  [id number]    CHIEF COMPLAINT:  not feeling baby move that much,not feeling well    HISTORY OF PRESENT ILLNESS:      The patient is a 32 y.o. female [id number] at 38w6d. Lisa Giles complains of not feeling baby move very much and patient is concerned about the same patient is feeling some cramping pain in the lower abdomen. is not feeling well.   OB History          4    Para   2    Term   2       0    AB   1    Living   2         SAB   1    IAB   0    Ectopic   0    Molar   0    Multiple   0    Live Births   2            Patient presents with a chief complaint as above and is being admitted for evaluation and management    Estimated Due Date: Estimated Date of Delivery: 10/17/22    PRENATAL CARE:    Complicated by:   Patient Active Problem List   Diagnosis Code    Complicated pregnancy, third trimester O26.93    Abdominal cramping complicating pregnancy I00.213, R10.9    40 weeks gestation of pregnancy Z3A.40    Normal pregnancy in third trimester Z34.93       PAST OB HISTORY  OB History          4    Para   2    Term   2       0    AB   1    Living   2         SAB   1    IAB   0    Ectopic   0    Molar   0    Multiple   0    Live Births   2                Past Medical History:        Diagnosis Date    Missed ab     for OR 20     Need for rhogam due to Rh negative mother      Past Surgical History:        Procedure Laterality Date    ADENOIDECTOMY Bilateral     DILATION AND CURETTAGE OF UTERUS N/A 2020    [facility]       Allergies:  Sulfa antibiotics  Social History:    Social History     Socioeconomic History    Marital status: Single     Spouse name: Not on file    Number of children: Not on file    Years of education: Not on file    Highest education level: Not on file Occupational History    Not on file   Tobacco Use    Smoking status: Never    Smokeless tobacco: Never   Vaping Use    Vaping Use: Never used   Substance and Sexual Activity    Alcohol use: No    Drug use: No    Sexual activity: Yes     Partners: Male   Other Topics Concern    Not on file   Social History Narrative    Not on file     Social Determinants of Health     Financial Resource Strain: Not on file   Food Insecurity: Not on file   Transportation Needs: Not on file   Physical Activity: Not on file   Stress: Not on file   Social Connections: Not on file   Intimate Partner Violence: Not on file   Housing Stability: Not on file     Family History:   No family history on file. Medications Prior to Admission:  Medications Prior to Admission: docusate sodium (COLACE, DULCOLAX) 100 MG CAPS, Take 100 mg by mouth daily  ibuprofen (ADVIL;MOTRIN) 800 MG tablet, Take 1 tablet by mouth every 8 hours as needed for Pain  Prenatal Multivit-Min-Fe-FA (PRE-DAHIANA FORMULA PO), Take 1 tablet by mouth daily LD 20    REVIEW OF SYSTEMS:    CONSTITUTIONAL:  negative  RESPIRATORY:  negative  CARDIOVASCULAR:  negative  GASTROINTESTINAL:  Negative  GENITOURINARY: Negative  ALLERGIC/IMMUNOLOGIC:  negative  NEUROLOGICAL:  negative  BEHAVIOR/PSYCH:  negative    PHYSICAL EXAM:  Blood pressure 125/71, pulse (!) 139, temperature 97.9 °F (36.6 °C), temperature source Oral, resp. rate 16, unknown if currently breastfeeding. General appearance:  awake, alert, cooperative, no apparent distress, and appears stated age  Abdomen:  Gravid  uterus, consistent with her gestational age 38w7d, no  Uterine contractions. , CVA tenderness No      Fetal heart rate:  Reassuring.  140,with accels and moderate variability,no decels,  Pelvis:  Adequate pelvis  Membranes:  intact  Extremities: nontender bilaterally,edema1+    DATA:  Labs:  CBC:   Lab Results   Component Value Date/Time    WBC 8.6 2022 11:33 AM    RBC 3.90 2022 11:33 AM    HGB 11.3 08/20/2022 11:33 AM    HCT 35.2 08/20/2022 11:33 AM    MCV 90.3 08/20/2022 11:33 AM    RDW 12.7 08/20/2022 11:33 AM     08/20/2022 11:33 AM     CMP:    Lab Results   Component Value Date/Time     02/09/2021 07:45 AM    K 3.7 02/09/2021 07:45 AM     02/09/2021 07:45 AM    CO2 17 02/09/2021 07:45 AM    BUN 6 02/09/2021 07:45 AM    PROT 6.3 02/09/2021 07:45 AM     U/A:  No components found for: Phylliss Boys, USPGRAV, UPH, UPROTEIN, UGLUCOSE, UKETONE, UBILI, UBLOOD, UNITRITE, UUROBIL, [facility], USQEPI, [place], UWBC, [place], Synchari, [place]  TSH:    Lab Results   Component Value Date/Time    TSH 2.090 07/21/2017 12:00 PM     RPR:  No results found for: RPR  RUBELLA: No results found for: RUBELLAIGG  HEPBSAG:   Lab Results   Component Value Date/Time    HBSAGI Non-Reactive 11/11/2015 11:13 AM     HIV:  No results found for: HIV  HgBA1c:  No components found for: HGBA1C  Blood Type:  No results found for: ABOINT  RH:  No results found for: ANATITER, C3, C4, RF  Antibody Screen:  No components found for: ABSCINT  Gonorrhea:  No components found for: PRBCHLGC  Chlamydia:  No components found for: CCHLAM  gbs-STREP B SCREEN: No results found for: GBSAG    No orders to display       Results for orders placed or performed during the hospital encounter of 10/09/22   Urinalysis with Microscopic   Result Value Ref Range    Color, UA Yellow Straw/Yellow    Clarity, UA Clear Clear    Glucose, Ur Negative Negative mg/dL    Bilirubin Urine Negative Negative    Ketones, Urine Negative Negative mg/dL    Specific Gravity, UA 1.010 1.005 - 1.030    Blood, Urine Negative Negative    pH, UA 7.0 5.0 - 9.0    Protein, UA Negative Negative mg/dL    Urobilinogen, Urine 0.2 <2.0 E.U./dL    Nitrite, Urine Negative Negative    Leukocyte Esterase, Urine LARGE (A) Negative    WBC, UA 10-20 (A) 0 - 5 /HPF    RBC, UA 1-3 0 - 2 /HPF    Epithelial Cells, UA MODERATE /HPF    Bacteria, UA FEW (A) None Seen /HPF    Yeast, UA Present (A) None Seen /HPF       ASSESSMENT AND PLAN:39 week pregnancy,UTI,decreased fetal movements  Principal Problem:    Normal pregnancy in third trimester  Resolved Problems:    * No resolved hospital problems. *      Labor: OBSERVATION, anticipate normal delivery, routine labor orders  Fetus: Reassuring  Keflex 500 potid for 7 days.             Electronically signed by Marie Busby MD on 10/9/2022 at 12:23 PM

## 2022-10-09 NOTE — PROGRESS NOTES
38w6d    Patient arrived to the unit for decreased fetal movement and irregular contractions. Patient denies any leaking of fluid or blood. Patient states that she feels a little bit of movement, but not as much. Placed on EFM.  Oriented to TR4 and call light in reach

## 2022-10-09 NOTE — DISCHARGE INSTRUCTIONS
Home Undelivered Discharge Instructions    After Discharge Orders:    Keep all future appointments. Call physician with any questions. Diet:  normal diet as tolerated    Rest: normal activity as tolerated    Other instructions: Do kick counts once a day on your baby. Choose the time of day your baby is most active. Get in a comfortable lying or sitting position and time how long it takes to feel 10 kicks, twists, turns, swishes, or rolls.  Call your physician or midwife if there have not been 10 kicks in 2 hours    Call physician or midwife, return to Labor and Delivery, call 911, or go to the nearest Emergency Room if: increased leakage or fluid, contractions more than  12 per  1 hour, decreased fetal movement, persistent low back pain or cramping, bleeding from vaginal area, difficulty urinating, pain with urination, difficulty breathing, new calf pain, persistent headache, or vision change

## 2022-10-11 ENCOUNTER — HOSPITAL ENCOUNTER (INPATIENT)
Age: 27
LOS: 3 days | Discharge: HOME | End: 2022-10-14
Attending: OBSTETRICS & GYNECOLOGY | Admitting: OBSTETRICS & GYNECOLOGY
Payer: COMMERCIAL

## 2022-10-11 PROBLEM — Z3A.39 39 WEEKS GESTATION OF PREGNANCY: Status: ACTIVE | Noted: 2022-10-11

## 2022-10-11 LAB
ABO/RH: NORMAL
ALBUMIN SERPL-MCNC: 3.5 G/DL (ref 3.5–5.2)
ALP BLD-CCNC: 278 U/L (ref 35–104)
ALT SERPL-CCNC: 7 U/L (ref 0–32)
AMPHETAMINE SCREEN, URINE: NOT DETECTED
ANION GAP SERPL CALCULATED.3IONS-SCNC: 15 MMOL/L (ref 7–16)
ANTIBODY IDENTIFICATION: NORMAL
ANTIBODY SCREEN: NORMAL
AST SERPL-CCNC: 11 U/L (ref 0–31)
BARBITURATE SCREEN URINE: NOT DETECTED
BASOPHILS ABSOLUTE: 0.02 E9/L (ref 0–0.2)
BASOPHILS RELATIVE PERCENT: 0.2 % (ref 0–2)
BENZODIAZEPINE SCREEN, URINE: NOT DETECTED
BILIRUB SERPL-MCNC: 0.3 MG/DL (ref 0–1.2)
BUN BLDV-MCNC: 7 MG/DL (ref 6–20)
CALCIUM SERPL-MCNC: 8.5 MG/DL (ref 8.6–10.2)
CANNABINOID SCREEN URINE: NOT DETECTED
CHLORIDE BLD-SCNC: 104 MMOL/L (ref 98–107)
CO2: 16 MMOL/L (ref 22–29)
COCAINE METABOLITE SCREEN URINE: NOT DETECTED
CREAT SERPL-MCNC: 0.6 MG/DL (ref 0.5–1)
DAT POLYSPECIFIC: NORMAL
EOSINOPHILS ABSOLUTE: 0.07 E9/L (ref 0.05–0.5)
EOSINOPHILS RELATIVE PERCENT: 0.7 % (ref 0–6)
FENTANYL SCREEN, URINE: NOT DETECTED
GFR AFRICAN AMERICAN: >60
GFR NON-AFRICAN AMERICAN: >60 ML/MIN/1.73
GLUCOSE BLD-MCNC: 129 MG/DL (ref 74–99)
HCT VFR BLD CALC: 33.7 % (ref 34–48)
HEMOGLOBIN: 10.9 G/DL (ref 11.5–15.5)
IMMATURE GRANULOCYTES #: 0.13 E9/L
IMMATURE GRANULOCYTES %: 1.3 % (ref 0–5)
LYMPHOCYTES ABSOLUTE: 2.02 E9/L (ref 1.5–4)
LYMPHOCYTES RELATIVE PERCENT: 20.4 % (ref 20–42)
Lab: NORMAL
MCH RBC QN AUTO: 27.1 PG (ref 26–35)
MCHC RBC AUTO-ENTMCNC: 32.3 % (ref 32–34.5)
MCV RBC AUTO: 83.8 FL (ref 80–99.9)
METHADONE SCREEN, URINE: NOT DETECTED
MONOCYTES ABSOLUTE: 0.63 E9/L (ref 0.1–0.95)
MONOCYTES RELATIVE PERCENT: 6.4 % (ref 2–12)
NEUTROPHILS ABSOLUTE: 7.03 E9/L (ref 1.8–7.3)
NEUTROPHILS RELATIVE PERCENT: 71 % (ref 43–80)
OPIATE SCREEN URINE: NOT DETECTED
OXYCODONE URINE: NOT DETECTED
PDW BLD-RTO: 13.7 FL (ref 11.5–15)
PHENCYCLIDINE SCREEN URINE: NOT DETECTED
PLATELET # BLD: 233 E9/L (ref 130–450)
PMV BLD AUTO: 8.9 FL (ref 7–12)
POTASSIUM SERPL-SCNC: 3.6 MMOL/L (ref 3.5–5)
RBC # BLD: 4.02 E12/L (ref 3.5–5.5)
SODIUM BLD-SCNC: 135 MMOL/L (ref 132–146)
TOTAL PROTEIN: 6.7 G/DL (ref 6.4–8.3)
WBC # BLD: 9.9 E9/L (ref 4.5–11.5)

## 2022-10-11 PROCEDURE — 86901 BLOOD TYPING SEROLOGIC RH(D): CPT

## 2022-10-11 PROCEDURE — A4216 STERILE WATER/SALINE, 10 ML: HCPCS | Performed by: OBSTETRICS & GYNECOLOGY

## 2022-10-11 PROCEDURE — 1220000001 HC SEMI PRIVATE L&D R&B

## 2022-10-11 PROCEDURE — 80053 COMPREHEN METABOLIC PANEL: CPT

## 2022-10-11 PROCEDURE — 86870 RBC ANTIBODY IDENTIFICATION: CPT

## 2022-10-11 PROCEDURE — 86880 COOMBS TEST DIRECT: CPT

## 2022-10-11 PROCEDURE — 2580000003 HC RX 258: Performed by: OBSTETRICS & GYNECOLOGY

## 2022-10-11 PROCEDURE — 36415 COLL VENOUS BLD VENIPUNCTURE: CPT

## 2022-10-11 PROCEDURE — 85025 COMPLETE CBC W/AUTO DIFF WBC: CPT

## 2022-10-11 PROCEDURE — 86850 RBC ANTIBODY SCREEN: CPT

## 2022-10-11 PROCEDURE — 3E033VJ INTRODUCTION OF OTHER HORMONE INTO PERIPHERAL VEIN, PERCUTANEOUS APPROACH: ICD-10-PCS | Performed by: OBSTETRICS & GYNECOLOGY

## 2022-10-11 PROCEDURE — 86900 BLOOD TYPING SEROLOGIC ABO: CPT

## 2022-10-11 PROCEDURE — 80307 DRUG TEST PRSMV CHEM ANLYZR: CPT

## 2022-10-11 PROCEDURE — 2500000003 HC RX 250 WO HCPCS: Performed by: OBSTETRICS & GYNECOLOGY

## 2022-10-11 PROCEDURE — 6360000002 HC RX W HCPCS: Performed by: OBSTETRICS & GYNECOLOGY

## 2022-10-11 RX ORDER — BUTORPHANOL TARTRATE 1 MG/ML
1 INJECTION, SOLUTION INTRAMUSCULAR; INTRAVENOUS
Status: DISCONTINUED | OUTPATIENT
Start: 2022-10-11 | End: 2022-10-12 | Stop reason: ALTCHOICE

## 2022-10-11 RX ORDER — SODIUM CHLORIDE, SODIUM LACTATE, POTASSIUM CHLORIDE, AND CALCIUM CHLORIDE .6; .31; .03; .02 G/100ML; G/100ML; G/100ML; G/100ML
1000 INJECTION, SOLUTION INTRAVENOUS PRN
Status: DISCONTINUED | OUTPATIENT
Start: 2022-10-11 | End: 2022-10-12 | Stop reason: ALTCHOICE

## 2022-10-11 RX ORDER — SODIUM CHLORIDE 9 MG/ML
25 INJECTION, SOLUTION INTRAVENOUS PRN
Status: DISCONTINUED | OUTPATIENT
Start: 2022-10-11 | End: 2022-10-12 | Stop reason: ALTCHOICE

## 2022-10-11 RX ORDER — ONDANSETRON 2 MG/ML
4 INJECTION INTRAMUSCULAR; INTRAVENOUS EVERY 6 HOURS PRN
Status: DISCONTINUED | OUTPATIENT
Start: 2022-10-11 | End: 2022-10-14 | Stop reason: HOSPADM

## 2022-10-11 RX ORDER — SODIUM CHLORIDE, SODIUM LACTATE, POTASSIUM CHLORIDE, CALCIUM CHLORIDE 600; 310; 30; 20 MG/100ML; MG/100ML; MG/100ML; MG/100ML
INJECTION, SOLUTION INTRAVENOUS CONTINUOUS
Status: DISCONTINUED | OUTPATIENT
Start: 2022-10-11 | End: 2022-10-12 | Stop reason: ALTCHOICE

## 2022-10-11 RX ORDER — SODIUM CHLORIDE, SODIUM LACTATE, POTASSIUM CHLORIDE, AND CALCIUM CHLORIDE .6; .31; .03; .02 G/100ML; G/100ML; G/100ML; G/100ML
500 INJECTION, SOLUTION INTRAVENOUS PRN
Status: DISCONTINUED | OUTPATIENT
Start: 2022-10-11 | End: 2022-10-12 | Stop reason: ALTCHOICE

## 2022-10-11 RX ORDER — MISOPROSTOL 200 UG/1
800 TABLET ORAL PRN
Status: DISCONTINUED | OUTPATIENT
Start: 2022-10-11 | End: 2022-10-14 | Stop reason: HOSPADM

## 2022-10-11 RX ORDER — SODIUM CHLORIDE 0.9 % (FLUSH) 0.9 %
5-40 SYRINGE (ML) INJECTION PRN
Status: DISCONTINUED | OUTPATIENT
Start: 2022-10-11 | End: 2022-10-12 | Stop reason: ALTCHOICE

## 2022-10-11 RX ORDER — DOCUSATE SODIUM 100 MG/1
100 CAPSULE, LIQUID FILLED ORAL 2 TIMES DAILY
Status: DISCONTINUED | OUTPATIENT
Start: 2022-10-11 | End: 2022-10-14 | Stop reason: HOSPADM

## 2022-10-11 RX ORDER — CEPHALEXIN 250 MG/1
250 CAPSULE ORAL 3 TIMES DAILY
Status: ON HOLD | COMMUNITY
End: 2022-10-14 | Stop reason: HOSPADM

## 2022-10-11 RX ORDER — SODIUM CHLORIDE 0.9 % (FLUSH) 0.9 %
5-40 SYRINGE (ML) INJECTION EVERY 12 HOURS SCHEDULED
Status: DISCONTINUED | OUTPATIENT
Start: 2022-10-11 | End: 2022-10-14 | Stop reason: HOSPADM

## 2022-10-11 RX ORDER — METHYLERGONOVINE MALEATE 0.2 MG/ML
200 INJECTION INTRAVENOUS PRN
Status: DISCONTINUED | OUTPATIENT
Start: 2022-10-11 | End: 2022-10-14 | Stop reason: HOSPADM

## 2022-10-11 RX ORDER — CARBOPROST TROMETHAMINE 250 UG/ML
250 INJECTION, SOLUTION INTRAMUSCULAR PRN
Status: DISCONTINUED | OUTPATIENT
Start: 2022-10-11 | End: 2022-10-14 | Stop reason: HOSPADM

## 2022-10-11 RX ADMIN — FAMOTIDINE 10 MG: 10 INJECTION, SOLUTION INTRAVENOUS at 19:51

## 2022-10-11 RX ADMIN — SODIUM CHLORIDE, POTASSIUM CHLORIDE, SODIUM LACTATE AND CALCIUM CHLORIDE: 600; 310; 30; 20 INJECTION, SOLUTION INTRAVENOUS at 17:02

## 2022-10-11 RX ADMIN — Medication 2 MILLI-UNITS/MIN: at 17:04

## 2022-10-12 PROCEDURE — 6360000002 HC RX W HCPCS: Performed by: OBSTETRICS & GYNECOLOGY

## 2022-10-12 PROCEDURE — 6370000000 HC RX 637 (ALT 250 FOR IP): Performed by: OBSTETRICS & GYNECOLOGY

## 2022-10-12 PROCEDURE — 1220000001 HC SEMI PRIVATE L&D R&B

## 2022-10-12 PROCEDURE — 7200000001 HC VAGINAL DELIVERY

## 2022-10-12 RX ORDER — SODIUM CHLORIDE 0.9 % (FLUSH) 0.9 %
5-40 SYRINGE (ML) INJECTION PRN
Status: DISCONTINUED | OUTPATIENT
Start: 2022-10-12 | End: 2022-10-14 | Stop reason: HOSPADM

## 2022-10-12 RX ORDER — ACETAMINOPHEN 500 MG
1000 TABLET ORAL EVERY 8 HOURS
Status: DISCONTINUED | OUTPATIENT
Start: 2022-10-12 | End: 2022-10-14 | Stop reason: HOSPADM

## 2022-10-12 RX ORDER — FERROUS SULFATE 325(65) MG
325 TABLET ORAL 2 TIMES DAILY WITH MEALS
Status: DISCONTINUED | OUTPATIENT
Start: 2022-10-12 | End: 2022-10-14 | Stop reason: HOSPADM

## 2022-10-12 RX ORDER — MODIFIED LANOLIN
OINTMENT (GRAM) TOPICAL PRN
Status: DISCONTINUED | OUTPATIENT
Start: 2022-10-12 | End: 2022-10-14 | Stop reason: HOSPADM

## 2022-10-12 RX ORDER — SODIUM CHLORIDE 0.9 % (FLUSH) 0.9 %
5-40 SYRINGE (ML) INJECTION EVERY 12 HOURS SCHEDULED
Status: DISCONTINUED | OUTPATIENT
Start: 2022-10-12 | End: 2022-10-14 | Stop reason: HOSPADM

## 2022-10-12 RX ORDER — SODIUM CHLORIDE 9 MG/ML
INJECTION, SOLUTION INTRAVENOUS PRN
Status: DISCONTINUED | OUTPATIENT
Start: 2022-10-12 | End: 2022-10-14 | Stop reason: HOSPADM

## 2022-10-12 RX ORDER — IBUPROFEN 800 MG/1
800 TABLET ORAL EVERY 8 HOURS
Status: DISCONTINUED | OUTPATIENT
Start: 2022-10-12 | End: 2022-10-14 | Stop reason: HOSPADM

## 2022-10-12 RX ORDER — DOCUSATE SODIUM 100 MG/1
100 CAPSULE, LIQUID FILLED ORAL DAILY
Status: DISCONTINUED | OUTPATIENT
Start: 2022-10-12 | End: 2022-10-12 | Stop reason: SDUPTHER

## 2022-10-12 RX ADMIN — ACETAMINOPHEN 1000 MG: 500 TABLET, COATED ORAL at 23:37

## 2022-10-12 RX ADMIN — DOCUSATE SODIUM 100 MG: 100 CAPSULE, LIQUID FILLED ORAL at 23:37

## 2022-10-12 RX ADMIN — IBUPROFEN 800 MG: 800 TABLET, FILM COATED ORAL at 18:28

## 2022-10-12 RX ADMIN — IBUPROFEN 800 MG: 800 TABLET, FILM COATED ORAL at 08:23

## 2022-10-12 RX ADMIN — MISOPROSTOL 800 MCG: 200 TABLET ORAL at 09:00

## 2022-10-12 RX ADMIN — Medication: at 11:30

## 2022-10-12 RX ADMIN — BUTORPHANOL TARTRATE 1 MG: 1 INJECTION, SOLUTION INTRAMUSCULAR; INTRAVENOUS at 06:42

## 2022-10-12 RX ADMIN — DOCUSATE SODIUM 100 MG: 100 CAPSULE, LIQUID FILLED ORAL at 15:30

## 2022-10-12 RX ADMIN — WITCH HAZEL: 500 SOLUTION RECTAL; TOPICAL at 11:29

## 2022-10-12 RX ADMIN — ACETAMINOPHEN 1000 MG: 500 TABLET, COATED ORAL at 15:31

## 2022-10-12 RX ADMIN — Medication 87.3 MILLI-UNITS/MIN: at 08:19

## 2022-10-12 ASSESSMENT — PAIN DESCRIPTION - DESCRIPTORS
DESCRIPTORS: CRAMPING
DESCRIPTORS: DISCOMFORT;CRAMPING;TIGHTNESS

## 2022-10-12 ASSESSMENT — PAIN SCALES - GENERAL
PAINLEVEL_OUTOF10: 2
PAINLEVEL_OUTOF10: 2
PAINLEVEL_OUTOF10: 8
PAINLEVEL_OUTOF10: 2
PAINLEVEL_OUTOF10: 2
PAINLEVEL_OUTOF10: 3

## 2022-10-12 ASSESSMENT — PAIN DESCRIPTION - LOCATION
LOCATION: ABDOMEN
LOCATION: ABDOMEN;BACK
LOCATION: ABDOMEN

## 2022-10-12 NOTE — H&P
Department of Obstetrics and Gynecology   Obstetrics History and Physical      Kecia MILLAN [facility]  10/12/2022  [id number]    CHIEF COMPLAINT: Term pregnancy for induction of labor    HISTORY OF PRESENT ILLNESS:      The patient is a 32 y.o. female [id number] at 44w2d.   With inducible cervix for induction of labor with GBS negative with Pitocin  OB History          4    Para   3    Term   3       0    AB   1    Living   3         SAB   1    IAB   0    Ectopic   0    Molar   0    Multiple   0    Live Births   3            Patient presents with a chief complaint as above and is being admitted for evaluation management    Estimated Due Date: Estimated Date of Delivery: 10/17/22    PRENATAL CARE:    Complicated by:   Patient Active Problem List   Diagnosis Code    Complicated pregnancy, third trimester O26.93    Abdominal cramping complicating pregnancy O05.486, R10.9    40 weeks gestation of pregnancy Z3A.40    Normal pregnancy in third trimester Z34.93    39 weeks gestation of pregnancy Z3A.39       PAST OB HISTORY  OB History          4    Para   3    Term   3       0    AB   1    Living   3         SAB   1    IAB   0    Ectopic   0    Molar   0    Multiple   0    Live Births   3                Past Medical History:        Diagnosis Date    Missed ab     for OR 20     Need for rhogam due to Rh negative mother      Past Surgical History:        Procedure Laterality Date    ADENOIDECTOMY Bilateral     DILATION AND CURETTAGE OF UTERUS N/A 2020    [facility]       Allergies:  Sulfa antibiotics  Social History:    Social History     Socioeconomic History    Marital status: Single     Spouse name: Not on file    Number of children: Not on file    Years of education: Not on file    Highest education level: Not on file   Occupational History    Not on file   Tobacco Use    Smoking status: Never Smokeless tobacco: Never   Vaping Use    Vaping Use: Never used   Substance and Sexual Activity    Alcohol use: No    Drug use: No    Sexual activity: Yes     Partners: Male   Other Topics Concern    Not on file   Social History Narrative    Not on file     Social Determinants of Health     Financial Resource Strain: Not on file   Food Insecurity: Not on file   Transportation Needs: Not on file   Physical Activity: Not on file   Stress: Not on file   Social Connections: Not on file   Intimate Partner Violence: Not on file   Housing Stability: Not on file     Family History:   History reviewed. No pertinent family history. Medications Prior to Admission:  Medications Prior to Admission: cephALEXin (KEFLEX) 250 MG capsule, Take 250 mg by mouth 3 times daily  [DISCONTINUED] docusate sodium (COLACE, DULCOLAX) 100 MG CAPS, Take 100 mg by mouth daily  [DISCONTINUED] ibuprofen (ADVIL;MOTRIN) 800 MG tablet, Take 1 tablet by mouth every 8 hours as needed for Pain  Prenatal Multivit-Min-Fe-FA (PRE- FORMULA PO), Take 1 tablet by mouth daily LD 20    REVIEW OF SYSTEMS:    CONSTITUTIONAL:  negative  RESPIRATORY:  negative  CARDIOVASCULAR:  negative  GASTROINTESTINAL:  Negative  GENITOURINARY: Negative  ALLERGIC/IMMUNOLOGIC:  negative  NEUROLOGICAL:  negative  BEHAVIOR/PSYCH:  negative    PHYSICAL EXAM:  Blood pressure (!) 112/59, pulse 73, temperature 98 °F (36.7 °C), temperature source Oral, resp. rate 18, height 5' 4\" (1.626 m), weight 170 lb (77.1 kg), SpO2 100 %, unknown if currently breastfeeding. General appearance:  awake, alert, cooperative, no apparent distress, and appears stated age  Abdomen:  Gravid  uterus, consistent with her gestational age 44w2d, no  Uterine contractions. , CVA tenderness No      Fetal heart rate:  Reassuring.  140,with accels and moderate variability,no decels,  Pelvis:  Adequate pelvis  Cervix: soft   2 cm   50%    -3  Presentation: CEPHALIC  Membranes:  intact  Extremities: nontender bilaterally,edema1+    DATA:  Labs:  CBC:   Lab Results   Component Value Date/Time    WBC 9.9 10/11/2022 04:00 PM    RBC 4.02 10/11/2022 04:00 PM    HGB 10.9 10/11/2022 04:00 PM    HCT 33.7 10/11/2022 04:00 PM    MCV 83.8 10/11/2022 04:00 PM    RDW 13.7 10/11/2022 04:00 PM     10/11/2022 04:00 PM     CMP:    Lab Results   Component Value Date/Time     10/11/2022 04:00 PM    K 3.6 10/11/2022 04:00 PM     10/11/2022 04:00 PM    CO2 16 10/11/2022 04:00 PM    BUN 7 10/11/2022 04:00 PM    PROT 6.7 10/11/2022 04:00 PM     U/A:  No components found for: Genora Shoulder, USPGRAV, UPH, UPROTEIN, UGLUCOSE, UKETONE, UBILI, UBLOOD, UNITRITE, UUROBIL, [place], USQEPI, [place], UWBC, [place], Synchari, [place]  TSH:    Lab Results   Component Value Date/Time    TSH 2.090 07/21/2017 12:00 PM     RPR:  No results found for: RPR  RUBELLA: No results found for: RUBELLAIGG  HEPBSAG:   Lab Results   Component Value Date/Time    HBSAGI Non-Reactive 11/11/2015 11:13 AM     HIV:  No results found for: HIV  HgBA1c:  No components found for: HGBA1C  Blood Type:  No results found for: ABOINT  RH:  No results found for: ANATITER, C3, C4, RF  Antibody Screen:  No components found for: ABSCINT  Gonorrhea:  No components found for: PRBCHLGC  Chlamydia:  No components found for: CCHLAM  gbs-STREP B SCREEN: No results found for: GBSAG    No orders to display       Results for orders placed or performed during the hospital encounter of 10/11/22   CBC auto differential   Result Value Ref Range    WBC 9.9 4.5 - 11.5 E9/L    RBC 4.02 3.50 - 5.50 E12/L    Hemoglobin 10.9 (L) 11.5 - 15.5 g/dL    Hematocrit 33.7 (L) 34.0 - 48.0 %    MCV 83.8 80.0 - 99.9 fL    MCH 27.1 26.0 - 35.0 pg    MCHC 32.3 32.0 - 34.5 %    RDW 13.7 11.5 - 15.0 fL    Platelets 467 329 - 426 E9/L    MPV 8.9 7.0 - 12.0 fL    Neutrophils % 71.0 43.0 - 80.0 %    Immature Granulocytes % 1.3 0.0 - 5.0 %    Lymphocytes % 20.4 20.0 - 42.0 %    Monocytes % 6.4 2.0 - 12.0 %    Eosinophils % 0.7 0.0 - 6.0 %    Basophils % 0.2 0.0 - 2.0 %    Neutrophils Absolute 7.03 1.80 - 7.30 E9/L    Immature Granulocytes # 0.13 E9/L    Lymphocytes Absolute 2.02 1.50 - 4.00 E9/L    Monocytes Absolute 0.63 0.10 - 0.95 E9/L    Eosinophils Absolute 0.07 0.05 - 0.50 E9/L    Basophils Absolute 0.02 0.00 - 0.20 E9/L   Comprehensive metabolic panel   Result Value Ref Range    Sodium 135 132 - 146 mmol/L    Potassium 3.6 3.5 - 5.0 mmol/L    Chloride 104 98 - 107 mmol/L    CO2 16 (L) 22 - 29 mmol/L    Anion Gap 15 7 - 16 mmol/L    Glucose 129 (H) 74 - 99 mg/dL    BUN 7 6 - 20 mg/dL    Creatinine 0.6 0.5 - 1.0 mg/dL    GFR Non-African American >60 >=60 mL/min/1.73    GFR African American >60     Calcium 8.5 (L) 8.6 - 10.2 mg/dL    Total Protein 6.7 6.4 - 8.3 g/dL    Albumin 3.5 3.5 - 5.2 g/dL    Total Bilirubin 0.3 0.0 - 1.2 mg/dL    Alkaline Phosphatase 278 (H) 35 - 104 U/L    ALT 7 0 - 32 U/L    AST 11 0 - 31 U/L   Urine Drug Screen   Result Value Ref Range    Amphetamine Screen, Urine NOT DETECTED Negative <1000 ng/mL    Barbiturate Screen, Ur NOT DETECTED Negative < 200 ng/mL    Benzodiazepine Screen, Urine NOT DETECTED Negative < 200 ng/mL    Cannabinoid Scrn, Ur NOT DETECTED Negative < 50ng/mL    Cocaine Metabolite Screen, Urine NOT DETECTED Negative < 300 ng/mL    Opiate Scrn, Ur NOT DETECTED Negative < 300ng/mL    PCP Screen, Urine NOT DETECTED Negative < 25 ng/mL    Methadone Screen, Urine NOT DETECTED Negative <300 ng/mL    Oxycodone Urine NOT DETECTED Negative <100 ng/mL    FENTANYL SCREEN, URINE NOT DETECTED Negative <1 ng/mL    Drug Screen Comment: see below    TYPE AND SCREEN   Result Value Ref Range    ABO/Rh A NEG     Antibody Screen POS    ANTIBODY IDENTIFICATION   Result Value Ref Range    Antibody ID POS, PassiveAnti-D,Patient Rec'd RHIG    DIRECT ANTIGLOBULIN TEST   Result Value Ref Range    Polyspecific Susan NEG        ASSESSMENT AND PLAN:39 week pregnancy, GBS negative induction of labor with Pitocin  Principal Problem:    39 weeks gestation of pregnancy  Resolved Problems:    * No resolved hospital problems.  *      Labor:, anticipate normal delivery, routine labor orders  Fetus: Reassuring  GBS: Negative  IVFluids,labs,analgesics/epidural as needed  Pitocin as per protocol        Electronically signed by Randy Weeks MD on 10/12/2022 at 7:49 AM

## 2022-10-12 NOTE — PROGRESS NOTES
Patient stated dizzy and large gush. Fundus massaged. Moderate clots passed. Cytotec given per protocol. Pericare performed. IVF bolus given. Pitocin infusing.

## 2022-10-12 NOTE — PROGRESS NOTES
Continuing care of patient from 11p-7a shift. Patient denies any needs at this time, call light within reach.

## 2022-10-12 NOTE — PROGRESS NOTES
Plan of care discussed with patient for the night. Call light within reach. Safe sleep practices reviewed and discussed, patient verbalizes understanding for baby to sleep in crib. Vitals obtained.

## 2022-10-12 NOTE — DISCHARGE SUMMARY
Obstetrical Discharge Form  Not  delivered         Patients Name  Barbara Crespo    Gestational Age:  39w2d    Antepartum complications: With vaginal leaking    Date of admission: 10/09/2022    Date of discharge 2022    Hospital course: Patient was admitted as an observation and monitoring done fetal heart tones were reactive no decelerations noticed no contractions noticed patient complaining of vaginal leaking AmniSure was done and that is all negative her perineum is completely dry and no obvious drainage noticed. Exam at  the time of discharge:/71   Pulse (!) 139   Temp 97.9 °F (36.6 °C) (Oral)   Resp 16   General appearance: alert, appears stated age, and cooperative  Back: symmetric, no curvature. ROM normal. No CVA tenderness. Pelvic: No obvious drainage noticed and nitrazine negative AmniSure negative   Extremities: extremities normal, atraumatic, no cyanosis or edema    Disposition:home    Patient instructions: Activity:activity as tolerated     Diet:regular diet     Kick count twice a day     Call when necessary for contractions cramping vaginal    bleeding or leaking     Call when necessary if right upper quadrant pain headache    dizziness black spots in front of eyes or swelling of the legs    or hands or face    Discharge medications:     Medication List        ASK your doctor about these medications      PRE-DAHIANA FORMULA PO                 Follow-up: Urmila Mancera MD, M.D.  In 1week      Signed:      ELISE Chung    10/12/2022    2:30 PM         :

## 2022-10-12 NOTE — L&D DELIVERY SUMMARY NOTE
Department of Obstetrics and Gynecology  Spontaneous Vaginal Delivery Note  Amandaronak Chioma BAH,[id number]    Pre-operative Diagnosis: Term pregnancy GBS negative for induction of labor    Post-operative Diagnosis:  Living  infant(s) and Female    Information for the patient's :  Luca Milan [[id number]]           Female infant     Tight cord around the neck reduced at the perineum after delivery of the head     Cord gases are sent  Infant Wt:   Information for the patient's :  Polo Hatfield [[id number]]      7 pound 6 ounce    APGARS:     Information for the patient's :  Polo Hatfield [[id number]]      At 1 minute 9 at 5-minute     Anesthesia:  none    Application and Delivery:  Absent    Delivery Summary:   Patient is admitted to R and placed on external monitors. Pitocin induction started as per protocol contractions are regular,FHT reactive with moderate variability without decels. Pt received analgesics IV and or epidural anesthesia. Progress of labor as anticipated and now fully dilated cervix. With subsequent contractions she started pushing and delivered vaginally spontaneously Placenta and cord and membranes delivered spontaneously. Pt is given pitocin in IV fluids. Vaginal walls,cervix,perineum,rectum intact. Uterus is well contracted. Pt is watched for next 1 hour. mother and baby both are  stable and doing well. Routine postpartum care    Specimen:  Placenta sent to pathology No    Estimated blood loss: 300 cc             Condition:  infant stable to general nursery and mother stable               Infant Feeding:    both breast and bottle - Similac with low iron    Charanjit Brown MD,M.D. 10/12/2022 7:50 AM    Jaspreet Bright

## 2022-10-13 LAB
HCT VFR BLD CALC: 23.3 % (ref 34–48)
HEMOGLOBIN: 7.2 G/DL (ref 11.5–15.5)

## 2022-10-13 PROCEDURE — 1220000001 HC SEMI PRIVATE L&D R&B

## 2022-10-13 PROCEDURE — 85018 HEMOGLOBIN: CPT

## 2022-10-13 PROCEDURE — 85014 HEMATOCRIT: CPT

## 2022-10-13 PROCEDURE — 36415 COLL VENOUS BLD VENIPUNCTURE: CPT

## 2022-10-13 PROCEDURE — 6370000000 HC RX 637 (ALT 250 FOR IP): Performed by: OBSTETRICS & GYNECOLOGY

## 2022-10-13 RX ORDER — PRENATAL WITH FERROUS FUM AND FOLIC ACID 3080; 920; 120; 400; 22; 1.84; 3; 20; 10; 1; 12; 200; 27; 25; 2 [IU]/1; [IU]/1; MG/1; [IU]/1; MG/1; MG/1; MG/1; MG/1; MG/1; MG/1; UG/1; MG/1; MG/1; MG/1; MG/1
1 TABLET ORAL DAILY
Status: DISCONTINUED | OUTPATIENT
Start: 2022-10-13 | End: 2022-10-14 | Stop reason: HOSPADM

## 2022-10-13 RX ADMIN — ACETAMINOPHEN 1000 MG: 500 TABLET, COATED ORAL at 16:42

## 2022-10-13 RX ADMIN — FERROUS SULFATE TAB 325 MG (65 MG ELEMENTAL FE) 325 MG: 325 (65 FE) TAB at 16:43

## 2022-10-13 RX ADMIN — FERROUS SULFATE TAB 325 MG (65 MG ELEMENTAL FE) 325 MG: 325 (65 FE) TAB at 08:23

## 2022-10-13 RX ADMIN — PRENATAL WITH FERROUS FUM AND FOLIC ACID 1 TABLET: 3080; 920; 120; 400; 22; 1.84; 3; 20; 10; 1; 12; 200; 27; 25; 2 TABLET ORAL at 09:55

## 2022-10-13 RX ADMIN — IBUPROFEN 800 MG: 800 TABLET, FILM COATED ORAL at 16:43

## 2022-10-13 RX ADMIN — IBUPROFEN 800 MG: 800 TABLET, FILM COATED ORAL at 07:15

## 2022-10-13 RX ADMIN — ACETAMINOPHEN 1000 MG: 500 TABLET, COATED ORAL at 08:23

## 2022-10-13 RX ADMIN — DOCUSATE SODIUM 100 MG: 100 CAPSULE, LIQUID FILLED ORAL at 08:23

## 2022-10-13 ASSESSMENT — PAIN DESCRIPTION - ORIENTATION
ORIENTATION: MID;LOWER
ORIENTATION: LOWER
ORIENTATION: LOWER

## 2022-10-13 ASSESSMENT — PAIN SCALES - GENERAL
PAINLEVEL_OUTOF10: 0
PAINLEVEL_OUTOF10: 1
PAINLEVEL_OUTOF10: 3

## 2022-10-13 ASSESSMENT — PAIN - FUNCTIONAL ASSESSMENT
PAIN_FUNCTIONAL_ASSESSMENT: ACTIVITIES ARE NOT PREVENTED
PAIN_FUNCTIONAL_ASSESSMENT: ACTIVITIES ARE NOT PREVENTED

## 2022-10-13 ASSESSMENT — PAIN DESCRIPTION - LOCATION
LOCATION: ABDOMEN

## 2022-10-13 ASSESSMENT — PAIN DESCRIPTION - DESCRIPTORS
DESCRIPTORS: CRAMPING

## 2022-10-13 NOTE — PROGRESS NOTES
Subjective:     Postpartum Day 1:   The patient feels well. The patient denies emotional concerns. Pain is well controlled with current medications. The baby iswell. Baby is feeding via breast. Urinary output is adequate. The patient is ambulating well. The patient is tolerating a normal diet. Flatus has been passed. Objective:        Blood pressure 119/72, pulse 70, temperature 97.6 °F (36.4 °C), temperature source Oral, resp. rate 16, height 5' 4\" (1.626 m), weight 170 lb (77.1 kg), SpO2 98 %, unknown if currently breastfeeding. No intake/output data recorded. Lab Results   Component Value Date    WBC 9.9 10/11/2022    HGB 7.2 (L) 10/13/2022    HCT 23.3 (L) 10/13/2022    MCV 83.8 10/11/2022     10/11/2022       General:    alert, appears stated age, and cooperative   Lungs:    Lochia:  appropriate   Uterine    Firmnontender, mild to moderate lochia   Back         no pain to palpation   DVT Evaluation:  No evidence of DVT seen on physical exam.  Negative Justin's sign.   @ LABS@    Assessment:     Postpartum day 1 doing well.female, anemia due to immediate PPH  Principal Problem:    39 weeks gestation of pregnancy  Resolved Problems:    * No resolved hospital problems. *    Plan:   PPH was controlled with the Cytotec rectally  Continue current care. Prenatal vit daily,  Pain meds as needed, routine postpartum care      Mehul Mcguire MD,MDEN. 10/13/2022 8:20 AM    Mike Bright  1995  [phone]

## 2022-10-13 NOTE — PROGRESS NOTES
[employer] Hearing screening results were discussed with parent. Questions answered. Brochure given to parent. Advised to monitor developmental milestones and contact physician for any concerns.    Andrea Adair, Eliza

## 2022-10-13 NOTE — PROGRESS NOTES
Patient found in bed sleeping with . Mother educated again on safe sleep practices Alone, on the back, in crib. Patient verbalized understanding.

## 2022-10-13 NOTE — PROGRESS NOTES
Assumed care of patient, assessment completed and plan of care discussed. Pt verbalizes good understanding. Call light within reach.

## 2022-10-14 VITALS
RESPIRATION RATE: 18 BRPM | HEART RATE: 88 BPM | OXYGEN SATURATION: 100 % | SYSTOLIC BLOOD PRESSURE: 123 MMHG | HEIGHT: 64 IN | TEMPERATURE: 97.4 F | WEIGHT: 170 LBS | BODY MASS INDEX: 29.02 KG/M2 | DIASTOLIC BLOOD PRESSURE: 66 MMHG

## 2022-10-14 PROCEDURE — 6370000000 HC RX 637 (ALT 250 FOR IP): Performed by: OBSTETRICS & GYNECOLOGY

## 2022-10-14 RX ORDER — FERROUS SULFATE 325(65) MG
325 TABLET ORAL 2 TIMES DAILY WITH MEALS
Qty: 60 TABLET | Refills: 3 | Status: SHIPPED | OUTPATIENT
Start: 2022-10-14 | End: 2022-11-13

## 2022-10-14 RX ORDER — PSEUDOEPHEDRINE HCL 30 MG
100 TABLET ORAL NIGHTLY
Qty: 30 CAPSULE | Refills: 1 | Status: SHIPPED | OUTPATIENT
Start: 2022-10-14 | End: 2022-11-13

## 2022-10-14 RX ORDER — IBUPROFEN 800 MG/1
800 TABLET ORAL EVERY 8 HOURS
Qty: 21 TABLET | Refills: 1 | Status: SHIPPED | OUTPATIENT
Start: 2022-10-14 | End: 2022-10-21

## 2022-10-14 RX ADMIN — IBUPROFEN 800 MG: 800 TABLET, FILM COATED ORAL at 01:55

## 2022-10-14 RX ADMIN — DOCUSATE SODIUM 100 MG: 100 CAPSULE, LIQUID FILLED ORAL at 08:02

## 2022-10-14 RX ADMIN — IBUPROFEN 800 MG: 800 TABLET, FILM COATED ORAL at 08:01

## 2022-10-14 RX ADMIN — DOCUSATE SODIUM 100 MG: 100 CAPSULE, LIQUID FILLED ORAL at 01:55

## 2022-10-14 RX ADMIN — FERROUS SULFATE TAB 325 MG (65 MG ELEMENTAL FE) 325 MG: 325 (65 FE) TAB at 08:02

## 2022-10-14 RX ADMIN — PRENATAL WITH FERROUS FUM AND FOLIC ACID 1 TABLET: 3080; 920; 120; 400; 22; 1.84; 3; 20; 10; 1; 12; 200; 27; 25; 2 TABLET ORAL at 08:02

## 2022-10-14 ASSESSMENT — PAIN SCALES - GENERAL
PAINLEVEL_OUTOF10: 1
PAINLEVEL_OUTOF10: 2

## 2022-10-14 ASSESSMENT — PAIN DESCRIPTION - LOCATION
LOCATION: ABDOMEN
LOCATION: HIP

## 2022-10-14 ASSESSMENT — PAIN DESCRIPTION - ORIENTATION: ORIENTATION: LOWER

## 2022-10-14 ASSESSMENT — PAIN DESCRIPTION - DESCRIPTORS
DESCRIPTORS: CRAMPING
DESCRIPTORS: CRAMPING

## 2022-10-14 NOTE — DISCHARGE SUMMARY
Patient ID:  Himanshu Doing  [id number]  55 y.o.  1995         Discharge Summary      Admit date: 10/11/2022    Discharge date and time:   2022    Admitting Physician: Vesna Durand MDMMaru HO. FACOG     Diagnoses: 39 weeks gestation of pregnancy [Z3A.39] induction of labor with Pitocin, vaginal delivery of female infant without any complications. Routine postpartum care    Discharged Condition: stable    Indication for Admission: 32 y.o. y.o. [id number] admitted at Term pregnancy in :\"Induced\" labor with  Pitocin  without complications    Procedures Performed:      female      Information for the patient's :  Milly Interiano Girl Zohra Blank [[id number]]    . apgars at 1 minute 9 at 5 minutes  Information for the patient's :  Fozia Torres [[id number]]        . 3345 g    Hospital Course: Patient was admitted on the day  and underwent an uncomplicated procedure. Pt received analgesics IV and /or epidural anesthesia and delivered without comps. postpartum care was uncomplicated. H/H stable,Vital stable,,Uterus nontender,perineum healing well/ incision and wound dry no bleeding or oozing, Moderate lochia,voided without probs and passed flatus. Discharge Exam:  /73   Pulse 90   Temp 98.3 °F (36.8 °C) (Oral)   Resp 16   Ht 5' 4\" (1.626 m)   Wt 170 lb (77.1 kg)   SpO2 100%   Breastfeeding Unknown   BMI 29.18 kg/m²   General appearance: alert, appears stated age, and cooperative  Abdomen: soft, non-tender; bowel sounds normal; no masses,  no organomegaly uterus well contracted nontender moderate lochia  Extremities: extremities normal, atraumatic, no cyanosis or edema and Homans sign is negative, no sign of DVT    Disposition: home    Patient Instructions:    Activity: activity as tolerated  Diet: regular diet  Wound Care: none needed    Discharge Medication:      Medication List        START taking these medications      docusate 100 MG Caps  Commonly known as: COLACE, DULCOLAX  Take 100 mg by mouth nightly Then as needed     ferrous sulfate 325 (65 Fe) MG tablet  Commonly known as: IRON 325  Take 1 tablet by mouth 2 times daily (with meals)     ibuprofen 800 MG tablet  Commonly known as: ADVIL;MOTRIN  Take 1 tablet by mouth in the morning and 1 tablet at noon and 1 tablet in the evening. Do all this for 7 days. CONTINUE taking these medications      PRE-DAHIANA FORMULA PO            STOP taking these medications      cephALEXin 250 MG capsule  Commonly known as: Ross Tao               Where to Get Your Medications        You can get these medications from any pharmacy    Bring a paper prescription for each of these medications  docusate 100 MG Caps  ferrous sulfate 325 (65 Fe) MG tablet  ibuprofen 800 MG tablet     Patient is asymptomatic as far as her hemoglobin is concerned may be discharged to home with prescription of iron and prenatal vitamins patient is going to call back as needed problems. Follow-up with Marie Busby MD,M.D. in 6 weeks.     Signed:  Marie Busby MD,MHIEN  10/14/2022  7:35 AM

## 2022-10-14 NOTE — PLAN OF CARE
Problem: Vaginal Birth or  Section  Goal: Fetal and maternal status remain reassuring during the birth process  Description:  Birth OB-Pregnancy care plan goal which identifies if the fetal and maternal status remain reassuring during the birth process  10/14/2022 0247 by Balaji Foster RN  Outcome: Completed  Note:      Problem: Postpartum  Goal: Experiences normal postpartum course  Description:  Postpartum OB-Pregnancy care plan goal which identifies if the mother is experiencing a normal postpartum course  10/14/2022 0826 by Kiara Gordon RN  Outcome: Completed  10/14/2022 0247 by Balaji Fostre RN  Outcome: Progressing  Goal: Appropriate maternal -  bonding  Description:  Postpartum OB-Pregnancy care plan goal which identifies if the mother and  are bonding appropriately  10/14/2022 0826 by Kiara Gordon RN  Outcome: Completed  10/14/2022 0247 by Balaji Foster RN  Outcome: Progressing  Goal: Establishment of infant feeding pattern  Description:  Postpartum OB-Pregnancy care plan goal which identifies if the mother is establishing a feeding pattern with their   10/14/2022 0826 by Kiara Gordon RN  Outcome: Completed  10/14/2022 0247 by Balaji Foster RN  Outcome: Progressing  Goal: Incisions, wounds, or drain sites healing without S/S of infection  10/14/2022 0826 by Kiara Gordon RN  Outcome: Completed  10/14/2022 0247 by Balaji Foster RN  Outcome: Progressing     Problem: Pain  Goal: Verbalizes/displays adequate comfort level or baseline comfort level  10/14/2022 0826 by Kiara Gordon RN  Outcome: Completed  10/14/2022 0247 by Balaji Foster RN  Outcome: Progressing  Flowsheets (Taken 10/14/2022 0247)  Verbalizes/displays adequate comfort level or baseline comfort level:   Encourage patient to monitor pain and request assistance   Assess pain using appropriate pain scale   Administer analgesics based on type and severity of pain and evaluate response   Implement non-pharmacological measures as appropriate and evaluate response   Consider cultural and social influences on pain and pain management   Notify Licensed Independent Practitioner if interventions unsuccessful or patient reports new pain  Note: Pain well controlled with current medications.      Problem: Infection - Adult  Goal: Absence of infection at discharge  10/14/2022 0826 by Gloria Escamilla RN  Outcome: Completed  10/14/2022 0247 by Odilia Adame RN  Outcome: Progressing  Goal: Absence of infection during hospitalization  10/14/2022 0826 by Gloria Escamilla RN  Outcome: Completed  10/14/2022 0247 by Odilia Adame RN  Outcome: Progressing  Goal: Absence of fever/infection during anticipated neutropenic period  10/14/2022 0826 by Gloria Escamilla RN  Outcome: Completed  10/14/2022 0247 by Odilia Adame RN  Outcome: Progressing     Problem: Discharge Planning  Goal: Discharge to home or other facility with appropriate resources  10/14/2022 0826 by Gloria Escamilla RN  Outcome: Completed  10/14/2022 0247 by Odilia Adame RN  Outcome: Progressing

## 2022-10-14 NOTE — PLAN OF CARE
Problem: Vaginal Birth or  Section  Goal: Fetal and maternal status remain reassuring during the birth process  Description:  Birth OB-Pregnancy care plan goal which identifies if the fetal and maternal status remain reassuring during the birth process  Outcome: Completed  Note:      Problem: Postpartum  Goal: Experiences normal postpartum course  Description:  Postpartum OB-Pregnancy care plan goal which identifies if the mother is experiencing a normal postpartum course  Outcome: Progressing  Goal: Appropriate maternal -  bonding  Description:  Postpartum OB-Pregnancy care plan goal which identifies if the mother and  are bonding appropriately  Outcome: Progressing  Goal: Establishment of infant feeding pattern  Description:  Postpartum OB-Pregnancy care plan goal which identifies if the mother is establishing a feeding pattern with their   Outcome: Progressing  Goal: Incisions, wounds, or drain sites healing without S/S of infection  Outcome: Progressing     Problem: Pain  Goal: Verbalizes/displays adequate comfort level or baseline comfort level  Outcome: Progressing  Flowsheets (Taken 10/14/2022 024)  Verbalizes/displays adequate comfort level or baseline comfort level:   Encourage patient to monitor pain and request assistance   Assess pain using appropriate pain scale   Administer analgesics based on type and severity of pain and evaluate response   Implement non-pharmacological measures as appropriate and evaluate response   Consider cultural and social influences on pain and pain management   Notify Licensed Independent Practitioner if interventions unsuccessful or patient reports new pain  Note: Pain well controlled with current medications.      Problem: Infection - Adult  Goal: Absence of infection at discharge  Outcome: Progressing  Goal: Absence of infection during hospitalization  Outcome: Progressing  Goal: Absence of fever/infection during anticipated neutropenic period  Outcome: Progressing     Problem: Discharge Planning  Goal: Discharge to home or other facility with appropriate resources  Outcome: Progressing     Problem: Safety - Adult  Goal: Free from fall injury  Outcome: Progressing     Problem: Chronic Conditions and Co-morbidities  Goal: Patient's chronic conditions and co-morbidity symptoms are monitored and maintained or improved  Outcome: Progressing

## 2022-10-14 NOTE — DISCHARGE INSTRUCTIONS
Return to office in 4 to 6 weeks for follow-up routine postpartum instructions the patient before discharge continue with iron sulfate and prenatal vitamin at home, Motrin and Tylenol alternate for the pain. Call as needed temperature dizziness lightheadedness. No sexual intercourse, tampon use, douching, tub baths, or swimming for 6 weeks until the healing process has completed. No heavy lifting or strenuous activity for 6 weeks. No driving for 2 weeks.

## 2022-10-14 NOTE — PROGRESS NOTES
Subjective:     Postpartum Day 2   The patient feels well. The patient denies emotional concerns. Pain is well controlled with current medications. The baby iswell. Baby is feeding via breast. Urinary output is adequate. The patient is ambulating well. The patient is tolerating a normal diet. Flatus has been passed. Objective:        Blood pressure 115/73, pulse 90, temperature 98.3 °F (36.8 °C), temperature source Oral, resp. rate 16, height 5' 4\" (1.626 m), weight 170 lb (77.1 kg), SpO2 100 %, unknown if currently breastfeeding. No intake/output data recorded. Lab Results   Component Value Date    WBC 9.9 10/11/2022    HGB 7.2 (L) 10/13/2022    HCT 23.3 (L) 10/13/2022    MCV 83.8 10/11/2022     10/11/2022       General:    alert, appears stated age, and cooperative   Lungs:    Lochia:  appropriate   Uterine    firmnontender   Back         no pain to palpation   DVT Evaluation:  No evidence of DVT seen on physical exam.  Negative Justin's sign.   @ LABS@    Assessment:     Postpartum day 2doing well.female  Principal Problem:    39 weeks gestation of pregnancy  Resolved Problems:    * No resolved hospital problems. *    Plan:     Discharge home with standard precautions and return to clinic in 4-6 weeks. Prenatal vit daily,  Pain meds as needed, routine postpartum care      Anuj Sheridan MD,MDEN. 10/14/2022 7:25 AM    Ember Bright  1995  [id number]

## 2022-10-14 NOTE — PROGRESS NOTES
Assumed care of pt for night shift. Vital signs and pain assessment obtained. Pt reports no pain on 0-10 scale. Pt does not express any concerns at this time and is agreeable with plan of care. Call light within reach. Will continue to monitor.

## 2024-06-05 ENCOUNTER — HOSPITAL ENCOUNTER (OUTPATIENT)
Age: 29
Discharge: HOME | End: 2024-06-05
Payer: COMMERCIAL

## 2024-06-05 PROCEDURE — 86850 RBC ANTIBODY SCREEN: CPT

## 2024-06-05 PROCEDURE — 6360000002 HC RX W HCPCS: Performed by: OBSTETRICS & GYNECOLOGY

## 2024-06-05 PROCEDURE — 86900 BLOOD TYPING SEROLOGIC ABO: CPT

## 2024-06-05 PROCEDURE — 36415 COLL VENOUS BLD VENIPUNCTURE: CPT

## 2024-06-05 PROCEDURE — 96372 THER/PROPH/DIAG INJ SC/IM: CPT

## 2024-06-05 PROCEDURE — 86901 BLOOD TYPING SEROLOGIC RH(D): CPT

## 2024-06-05 RX ADMIN — HUMAN RHO(D) IMMUNE GLOBULIN 300 MCG: 1500 SOLUTION INTRAMUSCULAR; INTRAVENOUS at 11:19

## 2024-06-06 LAB
ABO + RH BLD: NORMAL
BLOOD GROUP ANTIBODIES SERPL: NEGATIVE
COMPONENT: NORMAL
COMPONENT: NORMAL
HISTORY CHECK: NORMAL
Lab: 1
STATUS OF UNITS: NORMAL
TRANSFUSION STATUS: NORMAL
UNIT DIVISION: 0
UNIT NUMBER: NORMAL

## 2024-07-13 ENCOUNTER — HOSPITAL ENCOUNTER (OUTPATIENT)
Age: 29
Discharge: HOME | End: 2024-07-13
Attending: OBSTETRICS & GYNECOLOGY | Admitting: OBSTETRICS & GYNECOLOGY
Payer: COMMERCIAL

## 2024-07-13 VITALS
SYSTOLIC BLOOD PRESSURE: 128 MMHG | HEART RATE: 100 BPM | TEMPERATURE: 98.1 F | RESPIRATION RATE: 15 BRPM | DIASTOLIC BLOOD PRESSURE: 86 MMHG

## 2024-07-13 PROBLEM — O47.9 UTERINE CONTRACTIONS: Status: ACTIVE | Noted: 2024-07-13

## 2024-07-13 LAB
BILIRUB UR QL STRIP: NEGATIVE
C TRACH DNA SPEC QL PROBE+SIG AMP: NORMAL
CLARITY UR: CLEAR
COLOR UR: YELLOW
EPI CELLS #/AREA URNS HPF: ABNORMAL /HPF
GLUCOSE UR STRIP-MCNC: NEGATIVE MG/DL
HGB UR QL STRIP.AUTO: NEGATIVE
KETONES UR STRIP-MCNC: NEGATIVE MG/DL
LEUKOCYTE ESTERASE UR QL STRIP: ABNORMAL
N GONORRHOEA DNA SPEC QL PROBE+SIG AMP: NORMAL
NITRITE UR QL STRIP: NEGATIVE
PH UR STRIP: 7 [PH] (ref 5–9)
PROT UR STRIP-MCNC: NEGATIVE MG/DL
RBC #/AREA URNS HPF: ABNORMAL /HPF
SP GR UR STRIP: 1.01 (ref 1–1.03)
SPECIMEN DESCRIPTION: NORMAL
UROBILINOGEN UR STRIP-ACNC: 0.2 EU/DL (ref 0–1)
WBC #/AREA URNS HPF: ABNORMAL /HPF

## 2024-07-13 PROCEDURE — 96374 THER/PROPH/DIAG INJ IV PUSH: CPT

## 2024-07-13 PROCEDURE — 96360 HYDRATION IV INFUSION INIT: CPT

## 2024-07-13 PROCEDURE — 2580000003 HC RX 258: Performed by: OBSTETRICS & GYNECOLOGY

## 2024-07-13 PROCEDURE — 81001 URINALYSIS AUTO W/SCOPE: CPT

## 2024-07-13 PROCEDURE — 86403 PARTICLE AGGLUT ANTBDY SCRN: CPT

## 2024-07-13 PROCEDURE — 87591 N.GONORRHOEAE DNA AMP PROB: CPT

## 2024-07-13 PROCEDURE — G0463 HOSPITAL OUTPT CLINIC VISIT: HCPCS

## 2024-07-13 PROCEDURE — 87491 CHLMYD TRACH DNA AMP PROBE: CPT

## 2024-07-13 PROCEDURE — 96361 HYDRATE IV INFUSION ADD-ON: CPT

## 2024-07-13 PROCEDURE — 6360000002 HC RX W HCPCS: Performed by: OBSTETRICS & GYNECOLOGY

## 2024-07-13 PROCEDURE — 87081 CULTURE SCREEN ONLY: CPT

## 2024-07-13 PROCEDURE — 99202 OFFICE O/P NEW SF 15 MIN: CPT

## 2024-07-13 RX ORDER — SODIUM CHLORIDE, SODIUM LACTATE, POTASSIUM CHLORIDE, CALCIUM CHLORIDE 600; 310; 30; 20 MG/100ML; MG/100ML; MG/100ML; MG/100ML
INJECTION, SOLUTION INTRAVENOUS CONTINUOUS
Status: DISCONTINUED | OUTPATIENT
Start: 2024-07-13 | End: 2024-07-13 | Stop reason: HOSPADM

## 2024-07-13 RX ORDER — SODIUM CHLORIDE, SODIUM LACTATE, POTASSIUM CHLORIDE, AND CALCIUM CHLORIDE .6; .31; .03; .02 G/100ML; G/100ML; G/100ML; G/100ML
500 INJECTION, SOLUTION INTRAVENOUS ONCE
Status: COMPLETED | OUTPATIENT
Start: 2024-07-13 | End: 2024-07-13

## 2024-07-13 RX ADMIN — SODIUM CHLORIDE, POTASSIUM CHLORIDE, SODIUM LACTATE AND CALCIUM CHLORIDE 500 ML: 600; 310; 30; 20 INJECTION, SOLUTION INTRAVENOUS at 19:33

## 2024-07-13 RX ADMIN — WATER 1000 MG: 1 INJECTION INTRAMUSCULAR; INTRAVENOUS; SUBCUTANEOUS at 19:33

## 2024-07-13 ASSESSMENT — PAIN SCALES - GENERAL: PAINLEVEL_OUTOF10: 4

## 2024-07-13 NOTE — H&P
Department of Obstetrics and Gynecology   Obstetrics History and Physical      Tiera Bright  2026  [id number]    CHIEF COMPLAINT: Abdominal pains and contractions    HISTORY OF PRESENT ILLNESS:      The patient is a 28 y.o. female  at 35w6d.  With complains of abdominal pains and contractions no vaginal bleeding or spotting and no urinary complaints patient has not eaten any food since morning and possibly dehydration.  OB History          5    Para   3    Term   3       0    AB   1    Living   3         SAB   1    IAB   0    Ectopic   0    Molar   0    Multiple   0    Live Births   3            Patient presents with a chief complaint as above and is being admitted for evaluation management    Estimated Due Date: Estimated Date of Delivery: 24    PRENATAL CARE:    Complicated by:   Patient Active Problem List   Diagnosis Code    Complicated pregnancy, third trimester O26.93    Abdominal cramping complicating pregnancy O26.899, R10.9    40 weeks gestation of pregnancy Z3A.40    Normal pregnancy in third trimester Z34.93    39 weeks gestation of pregnancy Z3A.39    Uterine contractions O47.9       PAST OB HISTORY  OB History          5    Para   3    Term   3       0    AB   1    Living   3         SAB   1    IAB   0    Ectopic   0    Molar   0    Multiple   0    Live Births   3                Past Medical History:        Diagnosis Date    Missed ab     for OR 20     Need for rhogam due to Rh negative mother      Past Surgical History:        Procedure Laterality Date    ADENOIDECTOMY Bilateral     DILATION AND CURETTAGE OF UTERUS N/A 2020    DILATATION AND CURETTAGE SUCTION performed by Kemar Archuleta MD at [facility] OR    WISDOM TOOTH EXTRACTION       Allergies:  Sulfa antibiotics  Social History:    Social History     Socioeconomic History    Marital status: Single     Spouse name: Not on file    Number of children: Not on file    Years of education:  soft   Closed   50%    -2  Presentation: CEPHALIC  Membranes:  intact  Extremities: nontender bilaterally,edema1+    DATA:  Labs:  CBC:   Lab Results   Component Value Date/Time    WBC 9.9 10/11/2022 04:00 PM    RBC 4.02 10/11/2022 04:00 PM    HGB 7.2 10/13/2022 06:18 AM    HCT 23.3 10/13/2022 06:18 AM    MCV 83.8 10/11/2022 04:00 PM    RDW 13.7 10/11/2022 04:00 PM     10/11/2022 04:00 PM     CMP:    Lab Results   Component Value Date/Time     10/11/2022 04:00 PM    K 3.6 10/11/2022 04:00 PM     10/11/2022 04:00 PM    CO2 16 10/11/2022 04:00 PM    BUN 7 10/11/2022 04:00 PM     U/A:  No components found for: \"UCOLOR\", \"UCLARITY\", \"USPGRAV\", \"UPH\", \"UPROTEIN\", \"UGLUCOSE\", \"UKETONE\", \"UBILI\", \"UBLOOD\", \"UNITRITE\", \"UUROBIL\", \"ULEUKEST\", \"USQEPI\", \"URENEPI\", \"UWBC\", \"URBC\", \"UBACTERIA\", \"UHYALINE\"  TSH:    Lab Results   Component Value Date/Time    TSH 2.090 07/21/2017 12:00 PM     RPR:    Lab Results   Component Value Date/Time    RPR NON-REACTIVE 11/11/2015 11:13 AM     RUBELLA: No results found for: \"RUBELLAIGG\"  HEPBSAG:   Lab Results   Component Value Date/Time    HBSAGI Non-Reactive 11/11/2015 11:13 AM     HIV:  No results found for: \"HIV\"  HgBA1c:  No components found for: \"HGBA1C\"  Blood Type:  No results found for: \"ABOINT\"  RH:  No results found for: \"ANATITER\", \"C3\", \"C4\", \"RF\"  Antibody Screen:  No components found for: \"ABSCINT\"  Gonorrhea:  No components found for: \"PRBCHLGC\"  Chlamydia:  No components found for: \"CCHLAM\"  gbs-STREP B SCREEN: No results found for: \"GBSAG\"    No orders to display       Results for orders placed or performed during the hospital encounter of 07/13/24   C.trachomatis N.gonorrhoeae DNA    Specimen: Other; Cervix   Result Value Ref Range    Specimen Description Unknown     C. trachomatis DNA PENDING     N. gonorrhoeae DNA PENDING    Urinalysis with Microscopic   Result Value Ref Range    Color, UA Yellow Yellow    Turbidity UA Clear Clear    Glucose, Ur

## 2024-07-13 NOTE — PROGRESS NOTES
35w6d  EDC 24 ambulatory to L&D for c/o contractions, beginning at 1200 today. Patient states she thinks she may be dehydrated, though she has been trying to drink a lot of water over the course of the day. EFM applied. Maternal perception of fetal movement noted. Denies bleeding, leaking of fluid, or recent intercourse. Call light in reach.

## 2024-07-13 NOTE — PROGRESS NOTES
Dr. Díaz on unit, aware that cervix was closed but patient is beginning to have more pain with her contractions and they are becoming more frequent. Orders received.

## 2024-07-13 NOTE — PROGRESS NOTES
Dr. Díaz notified of patient's arrival to the unit as well as complaints. Aware of fetal heart rate, uterine activity, and patient's comfort level. Orders received.

## 2024-07-14 NOTE — DISCHARGE INSTRUCTIONS
Home Undelivered Discharge Instructions    After Discharge Orders:    No future appointments.    Call physician or midwife's office on *** for instructions.              Diet:  normal diet as tolerated, increase fluid intake      Rest: normal activity as tolerated    Other instructions: Do kick counts once a day on your baby. Choose the time of day your baby is most active. Get in a comfortable lying or sitting position and time how long it takes to feel 10 kicks, twists, turns, swishes, or rolls. Call your physician or midwife if there have not been 10 kicks in 1 hours    Call physician or midwife, return to Labor and Delivery, call 911, or go to the nearest Emergency Room if: increased leakage or fluid, contractions more than  10 per  30 minutes, decreased fetal movement, persistent low back pain or cramping, bleeding from vaginal area, difficulty urinating, pain with urination, difficulty breathing, new calf pain, persistent headache, or vision change

## 2024-07-14 NOTE — PROGRESS NOTES
Dr Díaz updated on patient status, fetal tracing, contraction pattern and vaginal exam.  Orders received see new orders

## 2024-07-16 LAB
MICROORGANISM SPEC CULT: ABNORMAL
SERVICE CMNT-IMP: ABNORMAL
SPECIMEN DESCRIPTION: ABNORMAL

## 2024-07-17 LAB
C TRACH DNA SPEC QL PROBE+SIG AMP: NEGATIVE
N GONORRHOEA DNA SPEC QL PROBE+SIG AMP: NEGATIVE
SPECIMEN DESCRIPTION: NORMAL

## 2024-08-03 ENCOUNTER — HOSPITAL ENCOUNTER (OUTPATIENT)
Age: 29
Discharge: HOME | End: 2024-08-03
Attending: OBSTETRICS & GYNECOLOGY | Admitting: OBSTETRICS & GYNECOLOGY
Payer: COMMERCIAL

## 2024-08-03 VITALS
TEMPERATURE: 97.8 F | HEART RATE: 115 BPM | SYSTOLIC BLOOD PRESSURE: 119 MMHG | OXYGEN SATURATION: 99 % | DIASTOLIC BLOOD PRESSURE: 73 MMHG | RESPIRATION RATE: 16 BRPM

## 2024-08-03 PROBLEM — Z3A.38 38 WEEKS GESTATION OF PREGNANCY: Status: ACTIVE | Noted: 2024-08-03

## 2024-08-03 PROCEDURE — 99212 OFFICE O/P EST SF 10 MIN: CPT

## 2024-08-03 NOTE — PROGRESS NOTES
38w6d Patient arrived ambulatory to unit with c/o contractions and placed on EFM for tracing.  Perception of fetal movement and denies any leaking or vaginal bleeding at this time.  Call light in reach

## 2024-08-03 NOTE — PROGRESS NOTES
Dr Díaz notified of patient arrival, BP, complaints, efm and contraction pattern. Remote reviewed tracing. To continue EFM for 30 minutes then return call to

## 2024-08-03 NOTE — DISCHARGE INSTRUCTIONS
Home Undelivered Discharge Instructions    After Discharge Orders:    No future appointments.    Call physician or midwife's office for appt.               Diet:  normal diet as tolerated    Rest: normal activity as tolerated    Other instructions: Do kick counts once a day on your baby. Choose the time of day your baby is most active. Get in a comfortable lying or sitting position and time how long it takes to feel 10 kicks, twists, turns, swishes, or rolls. Call your physician or midwife if there have not been 10 kicks in 1 hours    Call physician or midwife, return to Labor and Delivery, call 911, or go to the nearest Emergency Room if: increased leakage or fluid, decreased fetal movement, persistent low back pain or cramping, bleeding from vaginal area, difficulty urinating, pain with urination, difficulty breathing, new calf pain, persistent headache, or vision change

## 2024-08-03 NOTE — PROGRESS NOTES
ambulated self to floor c/o ctx x 1 week. Escorted to room EFM/[place] applied SVE as noted. Patient reports positive fetal movement and denies any leaking of fluid or vaginal bleeding. Call bell within reach.

## 2024-08-06 ENCOUNTER — HOSPITAL ENCOUNTER (INPATIENT)
Age: 29
LOS: 2 days | Discharge: HOME | End: 2024-08-08
Attending: OBSTETRICS & GYNECOLOGY | Admitting: OBSTETRICS & GYNECOLOGY
Payer: COMMERCIAL

## 2024-08-06 ENCOUNTER — APPOINTMENT (OUTPATIENT)
Dept: LABOR AND DELIVERY | Age: 29
End: 2024-08-06
Payer: COMMERCIAL

## 2024-08-06 PROBLEM — Z37.9 NORMAL LABOR: Status: ACTIVE | Noted: 2024-08-06

## 2024-08-06 LAB
ABO + RH BLD: NORMAL
ALBUMIN SERPL-MCNC: 3.4 G/DL (ref 3.5–5.2)
ALP SERPL-CCNC: 195 U/L (ref 35–104)
ALT SERPL-CCNC: 8 U/L (ref 0–32)
AMPHET UR QL SCN: NEGATIVE
ANION GAP SERPL CALCULATED.3IONS-SCNC: 11 MMOL/L (ref 7–16)
ANTIBODY IDENTIFICATION: NORMAL
ARM BAND NUMBER: NORMAL
AST SERPL-CCNC: 12 U/L (ref 0–31)
BARBITURATES UR QL SCN: NEGATIVE
BASOPHILS # BLD: 0.03 K/UL (ref 0–0.2)
BASOPHILS NFR BLD: 0 % (ref 0–2)
BENZODIAZ UR QL: NEGATIVE
BILIRUB SERPL-MCNC: 0.4 MG/DL (ref 0–1.2)
BLOOD BANK SAMPLE EXPIRATION: NORMAL
BLOOD GROUP ANTIBODIES SERPL: POSITIVE
BUN SERPL-MCNC: 9 MG/DL (ref 6–20)
BUPRENORPHINE UR QL: NEGATIVE
CALCIUM SERPL-MCNC: 9.2 MG/DL (ref 8.6–10.2)
CANNABINOIDS UR QL SCN: NEGATIVE
CHLORIDE SERPL-SCNC: 106 MMOL/L (ref 98–107)
CO2 SERPL-SCNC: 19 MMOL/L (ref 22–29)
COCAINE UR QL SCN: NEGATIVE
CREAT SERPL-MCNC: 0.6 MG/DL (ref 0.5–1)
DAT POLY-SP REAG RBC QL: NEGATIVE
EOSINOPHIL # BLD: 0.08 K/UL (ref 0.05–0.5)
EOSINOPHILS RELATIVE PERCENT: 1 % (ref 0–6)
ERYTHROCYTE [DISTWIDTH] IN BLOOD BY AUTOMATED COUNT: 14.9 % (ref 11.5–15)
FENTANYL UR QL: NEGATIVE
GFR, ESTIMATED: >90 ML/MIN/1.73M2
GLUCOSE SERPL-MCNC: 81 MG/DL (ref 74–99)
HCT VFR BLD AUTO: 32.9 % (ref 34–48)
HGB BLD-MCNC: 10.1 G/DL (ref 11.5–15.5)
IMM GRANULOCYTES # BLD AUTO: 0.08 K/UL (ref 0–0.58)
IMM GRANULOCYTES NFR BLD: 1 % (ref 0–5)
LYMPHOCYTES NFR BLD: 1.94 K/UL (ref 1.5–4)
LYMPHOCYTES RELATIVE PERCENT: 23 % (ref 20–42)
MCH RBC QN AUTO: 24.3 PG (ref 26–35)
MCHC RBC AUTO-ENTMCNC: 30.7 G/DL (ref 32–34.5)
MCV RBC AUTO: 79.1 FL (ref 80–99.9)
METHADONE UR QL: NEGATIVE
MONOCYTES NFR BLD: 0.54 K/UL (ref 0.1–0.95)
MONOCYTES NFR BLD: 6 % (ref 2–12)
NEUTROPHILS NFR BLD: 69 % (ref 43–80)
NEUTS SEG NFR BLD: 5.91 K/UL (ref 1.8–7.3)
OPIATES UR QL SCN: NEGATIVE
OXYCODONE UR QL SCN: NEGATIVE
PCP UR QL SCN: NEGATIVE
PLATELET # BLD AUTO: 237 K/UL (ref 130–450)
PMV BLD AUTO: 9.3 FL (ref 7–12)
POTASSIUM SERPL-SCNC: 4 MMOL/L (ref 3.5–5)
PROT SERPL-MCNC: 6.6 G/DL (ref 6.4–8.3)
RBC # BLD AUTO: 4.16 M/UL (ref 3.5–5.5)
SODIUM SERPL-SCNC: 136 MMOL/L (ref 132–146)
TEST INFORMATION: NORMAL
WBC OTHER # BLD: 8.6 K/UL (ref 4.5–11.5)

## 2024-08-06 PROCEDURE — 6370000000 HC RX 637 (ALT 250 FOR IP): Performed by: OBSTETRICS & GYNECOLOGY

## 2024-08-06 PROCEDURE — 86901 BLOOD TYPING SEROLOGIC RH(D): CPT

## 2024-08-06 PROCEDURE — 85025 COMPLETE CBC W/AUTO DIFF WBC: CPT

## 2024-08-06 PROCEDURE — 1220000001 HC SEMI PRIVATE L&D R&B

## 2024-08-06 PROCEDURE — 7200000001 HC VAGINAL DELIVERY

## 2024-08-06 PROCEDURE — 88307 TISSUE EXAM BY PATHOLOGIST: CPT

## 2024-08-06 PROCEDURE — 86870 RBC ANTIBODY IDENTIFICATION: CPT

## 2024-08-06 PROCEDURE — 2580000003 HC RX 258: Performed by: OBSTETRICS & GYNECOLOGY

## 2024-08-06 PROCEDURE — 86880 COOMBS TEST DIRECT: CPT

## 2024-08-06 PROCEDURE — 86592 SYPHILIS TEST NON-TREP QUAL: CPT

## 2024-08-06 PROCEDURE — 6360000002 HC RX W HCPCS: Performed by: OBSTETRICS & GYNECOLOGY

## 2024-08-06 PROCEDURE — 3E033VJ INTRODUCTION OF OTHER HORMONE INTO PERIPHERAL VEIN, PERCUTANEOUS APPROACH: ICD-10-PCS | Performed by: OBSTETRICS & GYNECOLOGY

## 2024-08-06 PROCEDURE — 80053 COMPREHEN METABOLIC PANEL: CPT

## 2024-08-06 PROCEDURE — 80307 DRUG TEST PRSMV CHEM ANLYZR: CPT

## 2024-08-06 PROCEDURE — 6370000000 HC RX 637 (ALT 250 FOR IP)

## 2024-08-06 PROCEDURE — 86900 BLOOD TYPING SEROLOGIC ABO: CPT

## 2024-08-06 PROCEDURE — 86850 RBC ANTIBODY SCREEN: CPT

## 2024-08-06 RX ORDER — METHYLERGONOVINE MALEATE 0.2 MG/ML
200 INJECTION INTRAVENOUS PRN
Status: DISCONTINUED | OUTPATIENT
Start: 2024-08-06 | End: 2024-08-08 | Stop reason: HOSPADM

## 2024-08-06 RX ORDER — DOCUSATE SODIUM 100 MG/1
100 CAPSULE, LIQUID FILLED ORAL DAILY
Status: DISCONTINUED | OUTPATIENT
Start: 2024-08-06 | End: 2024-08-08 | Stop reason: HOSPADM

## 2024-08-06 RX ORDER — LIDOCAINE HYDROCHLORIDE 10 MG/ML
INJECTION, SOLUTION INFILTRATION; PERINEURAL
Status: DISCONTINUED
Start: 2024-08-06 | End: 2024-08-06

## 2024-08-06 RX ORDER — CARBOPROST TROMETHAMINE 250 UG/ML
250 INJECTION, SOLUTION INTRAMUSCULAR PRN
Status: DISCONTINUED | OUTPATIENT
Start: 2024-08-06 | End: 2024-08-08 | Stop reason: HOSPADM

## 2024-08-06 RX ORDER — SODIUM CHLORIDE, SODIUM LACTATE, POTASSIUM CHLORIDE, AND CALCIUM CHLORIDE .6; .31; .03; .02 G/100ML; G/100ML; G/100ML; G/100ML
500 INJECTION, SOLUTION INTRAVENOUS PRN
Status: DISCONTINUED | OUTPATIENT
Start: 2024-08-06 | End: 2024-08-06

## 2024-08-06 RX ORDER — ACETAMINOPHEN 500 MG
1000 TABLET ORAL EVERY 8 HOURS SCHEDULED
Status: DISCONTINUED | OUTPATIENT
Start: 2024-08-06 | End: 2024-08-06

## 2024-08-06 RX ORDER — SODIUM CHLORIDE 9 MG/ML
INJECTION, SOLUTION INTRAVENOUS PRN
Status: DISCONTINUED | OUTPATIENT
Start: 2024-08-06 | End: 2024-08-06

## 2024-08-06 RX ORDER — FERROUS SULFATE 325(65) MG
325 TABLET ORAL 2 TIMES DAILY WITH MEALS
Status: DISCONTINUED | OUTPATIENT
Start: 2024-08-06 | End: 2024-08-08 | Stop reason: HOSPADM

## 2024-08-06 RX ORDER — IBUPROFEN 800 MG/1
800 TABLET ORAL EVERY 8 HOURS SCHEDULED
Status: DISCONTINUED | OUTPATIENT
Start: 2024-08-06 | End: 2024-08-06

## 2024-08-06 RX ORDER — SODIUM CHLORIDE 9 MG/ML
25 INJECTION, SOLUTION INTRAVENOUS PRN
Status: DISCONTINUED | OUTPATIENT
Start: 2024-08-06 | End: 2024-08-06

## 2024-08-06 RX ORDER — SODIUM CHLORIDE 0.9 % (FLUSH) 0.9 %
5-40 SYRINGE (ML) INJECTION EVERY 12 HOURS SCHEDULED
Status: DISCONTINUED | OUTPATIENT
Start: 2024-08-06 | End: 2024-08-06

## 2024-08-06 RX ORDER — DOCUSATE SODIUM 100 MG/1
100 CAPSULE, LIQUID FILLED ORAL 2 TIMES DAILY
Status: DISCONTINUED | OUTPATIENT
Start: 2024-08-06 | End: 2024-08-06

## 2024-08-06 RX ORDER — ONDANSETRON 4 MG/1
4 TABLET, ORALLY DISINTEGRATING ORAL EVERY 6 HOURS PRN
Status: DISCONTINUED | OUTPATIENT
Start: 2024-08-06 | End: 2024-08-08 | Stop reason: HOSPADM

## 2024-08-06 RX ORDER — IBUPROFEN 800 MG/1
800 TABLET ORAL EVERY 8 HOURS PRN
Status: DISCONTINUED | OUTPATIENT
Start: 2024-08-07 | End: 2024-08-08 | Stop reason: HOSPADM

## 2024-08-06 RX ORDER — NALBUPHINE HYDROCHLORIDE 10 MG/ML
10 INJECTION, SOLUTION INTRAMUSCULAR; INTRAVENOUS; SUBCUTANEOUS
Status: DISCONTINUED | OUTPATIENT
Start: 2024-08-06 | End: 2024-08-06

## 2024-08-06 RX ORDER — SODIUM CHLORIDE 0.9 % (FLUSH) 0.9 %
5-40 SYRINGE (ML) INJECTION PRN
Status: DISCONTINUED | OUTPATIENT
Start: 2024-08-06 | End: 2024-08-06

## 2024-08-06 RX ORDER — SODIUM CHLORIDE, SODIUM LACTATE, POTASSIUM CHLORIDE, CALCIUM CHLORIDE 600; 310; 30; 20 MG/100ML; MG/100ML; MG/100ML; MG/100ML
INJECTION, SOLUTION INTRAVENOUS CONTINUOUS
Status: DISCONTINUED | OUTPATIENT
Start: 2024-08-06 | End: 2024-08-06

## 2024-08-06 RX ORDER — IBUPROFEN 800 MG/1
TABLET ORAL
Status: COMPLETED
Start: 2024-08-06 | End: 2024-08-06

## 2024-08-06 RX ORDER — ONDANSETRON 2 MG/ML
4 INJECTION INTRAMUSCULAR; INTRAVENOUS EVERY 6 HOURS PRN
Status: DISCONTINUED | OUTPATIENT
Start: 2024-08-06 | End: 2024-08-06

## 2024-08-06 RX ORDER — MISOPROSTOL 200 UG/1
400 TABLET ORAL PRN
Status: DISCONTINUED | OUTPATIENT
Start: 2024-08-06 | End: 2024-08-08 | Stop reason: HOSPADM

## 2024-08-06 RX ORDER — ACETAMINOPHEN 500 MG
1000 TABLET ORAL EVERY 8 HOURS PRN
Status: DISCONTINUED | OUTPATIENT
Start: 2024-08-06 | End: 2024-08-08 | Stop reason: HOSPADM

## 2024-08-06 RX ORDER — MODIFIED LANOLIN
OINTMENT (GRAM) TOPICAL PRN
Status: DISCONTINUED | OUTPATIENT
Start: 2024-08-06 | End: 2024-08-08 | Stop reason: HOSPADM

## 2024-08-06 RX ORDER — TERBUTALINE SULFATE 1 MG/ML
0.25 INJECTION, SOLUTION SUBCUTANEOUS
Status: DISCONTINUED | OUTPATIENT
Start: 2024-08-06 | End: 2024-08-06

## 2024-08-06 RX ADMIN — Medication 2.5 MILLION UNITS: at 11:24

## 2024-08-06 RX ADMIN — IBUPROFEN 800 MG: 800 TABLET ORAL at 18:15

## 2024-08-06 RX ADMIN — SODIUM CHLORIDE, POTASSIUM CHLORIDE, SODIUM LACTATE AND CALCIUM CHLORIDE: 600; 310; 30; 20 INJECTION, SOLUTION INTRAVENOUS at 07:15

## 2024-08-06 RX ADMIN — ACETAMINOPHEN 1000 MG: 500 TABLET ORAL at 21:15

## 2024-08-06 RX ADMIN — DOCUSATE SODIUM 100 MG: 100 CAPSULE, LIQUID FILLED ORAL at 17:38

## 2024-08-06 RX ADMIN — Medication 2 MILLI-UNITS/MIN: at 07:48

## 2024-08-06 RX ADMIN — IBUPROFEN 800 MG: 800 TABLET, FILM COATED ORAL at 18:15

## 2024-08-06 RX ADMIN — SODIUM CHLORIDE 5 MILLION UNITS: 900 INJECTION INTRAVENOUS at 07:49

## 2024-08-06 ASSESSMENT — PAIN DESCRIPTION - DESCRIPTORS
DESCRIPTORS: CRAMPING
DESCRIPTORS: CRAMPING

## 2024-08-06 ASSESSMENT — PAIN SCALES - GENERAL
PAINLEVEL_OUTOF10: 0
PAINLEVEL_OUTOF10: 0
PAINLEVEL_OUTOF10: 3
PAINLEVEL_OUTOF10: 4

## 2024-08-06 ASSESSMENT — PAIN DESCRIPTION - LOCATION
LOCATION: ABDOMEN
LOCATION: ABDOMEN

## 2024-08-06 ASSESSMENT — PAIN DESCRIPTION - ORIENTATION: ORIENTATION: LOWER;MID

## 2024-08-06 NOTE — PROGRESS NOTES
Assumed care of patient for [id number] shift. Doc of care reviewed with patient, patient verbalizes understanding. Patient perceives fetal movement. Call light within reach.

## 2024-08-06 NOTE — H&P
Department of Obstetrics and Gynecology   Obstetrics History and Physical      Tiera Bright  2024  [id number]    CHIEF COMPLAINT: Complains of abdominal pains    HISTORY OF PRESENT ILLNESS:      The patient is a 29 y.o. female  at 39w IUP with abdominal pains and contractions, no vaginal bleeding no vaginal leaking  OB History          5    Para   3    Term   3       0    AB   1    Living   3         SAB   1    IAB   0    Ectopic   0    Molar   0    Multiple   0    Live Births   3            Patient presents with a chief complaint as above and is being admitted for rule out labor GBS positive    Estimated Due Date: Estimated Date of Delivery: 24    PRENATAL CARE:    Complicated by:   Patient Active Problem List   Diagnosis Code    Complicated pregnancy, third trimester O26.93    Abdominal cramping complicating pregnancy O26.899, R10.9    40 weeks gestation of pregnancy Z3A.40    Normal pregnancy in third trimester Z34.93    39 weeks gestation of pregnancy Z3A.39    Uterine contractions O47.9    38 weeks gestation of pregnancy Z3A.38    Normal labor O80, Z37.9       PAST OB HISTORY  OB History          5    Para   3    Term   3       0    AB   1    Living   3         SAB   1    IAB   0    Ectopic   0    Molar   0    Multiple   0    Live Births   3                Past Medical History:        Diagnosis Date    Missed ab     for OR 20     Need for rhogam due to Rh negative mother      Past Surgical History:        Procedure Laterality Date    ADENOIDECTOMY Bilateral     DILATION AND CURETTAGE OF UTERUS N/A 2020    DILATATION AND CURETTAGE SUCTION performed by Kemar Archuleta MD at [facility] OR    WISDOM TOOTH EXTRACTION       Allergies:  Sulfa antibiotics  Social History:    Social History     Socioeconomic History    Marital status: Single     Spouse name: Not on file    Number of children: Not on file    Years of education: Not on file    Highest education

## 2024-08-06 NOTE — H&P
Department of Obstetrics and Gynecology   Obstetrics History and Physical      Tiera Bright  2024  [id number]    CHIEF COMPLAINT: Induction of labor    HISTORY OF PRESENT ILLNESS:      The patient is a 29 y.o. female  at 39w2d.  Here for induction of labor with GBS being positive sensitive to penicillin G.  OB History          5    Para   3    Term   3       0    AB   1    Living   3         SAB   1    IAB   0    Ectopic   0    Molar   0    Multiple   0    Live Births   3            Patient presents with a chief complaint as above and is being admitted for induction of labor with Pitocin irregular    Estimated Due Date: Estimated Date of Delivery: 24    PRENATAL CARE:    Complicated by:   Patient Active Problem List   Diagnosis Code    Complicated pregnancy, third trimester O26.93    Abdominal cramping complicating pregnancy O26.899, R10.9    40 weeks gestation of pregnancy Z3A.40    Normal pregnancy in third trimester Z34.93    39 weeks gestation of pregnancy Z3A.39    Uterine contractions O47.9    38 weeks gestation of pregnancy Z3A.38    Normal labor O80, Z37.9       PAST OB HISTORY  OB History          5    Para   3    Term   3       0    AB   1    Living   3         SAB   1    IAB   0    Ectopic   0    Molar   0    Multiple   0    Live Births   3                Past Medical History:        Diagnosis Date    Missed ab     for OR 20     Need for rhogam due to Rh negative mother      Past Surgical History:        Procedure Laterality Date    ADENOIDECTOMY Bilateral     DILATION AND CURETTAGE OF UTERUS N/A 2020    DILATATION AND CURETTAGE SUCTION performed by Kemar Archuleta MD at [facility] OR    WISDOM TOOTH EXTRACTION       Allergies:  Sulfa antibiotics  Social History:    Social History     Socioeconomic History    Marital status: Single     Spouse name: Not on file    Number of children: Not on file    Years of education: Not on file    Highest

## 2024-08-06 NOTE — L&D DELIVERY SUMMARY NOTE
Department of Obstetrics and Gynecology  Spontaneous Vaginal Delivery Note  Name: Tiera Bright  MRN: [id number]  Admission Date: 2024  Delivery Date/Time: 2024  3:51 PM     Pre-operative Diagnosis:  Term pregnancy for induction of labor with Pitocin,GBS positive    Post-operative Diagnosis:  Living  infant(s) and Male    Information for the patient's :  Mauricio Bright [[id number]]         Tight cord around the neck  Cord gases sent for analysis, placenta sent to pathology       Labor & Delivery Summary  Dilation Complete Date: 24  Dilation Complete Time: 1545    Baby Gender:  Male infant    Infant Wt: 3490 g  Birth Weight: N/A        APGARS:  8 at1 minute and 9 at 5 minutes   APGAR One: N/A  APGAR Five: N/A  APGAR Ten: N/A       Anesthesia:  none    Application and Delivery:  Absent    Delivery Summary:  Labor & Delivery Summary  Dilation Complete Date: 24  Dilation Complete Time: 1545Patient is admitted to LDR and placed on external monitors.fetal heart tones reactive no decelerations noticed, no contractions noticed.  GBS is Positive induction is started with Pitocin as per protocol .contractions are regular,FHT reactive with moderate variability without decels.Pt received analgesics IV. Progress of labor as anticipated and patient had Spontaneous rupture of membranes.  external monitors are placed.  Labor progress is monitored now fully dilated cervix.With subsequent contractions she started pushing and delivered vaginally spontaneously without any complications ,Placenta and cord and membranes delivered spontaneously.Pt is given pitocin in IV fluids...Vaginal walls,cervix,perineum,rectum intact.Uterus is well contracted.Pt is watched for next 1 hour.mother and baby both are  stable and doing well.Routine postpartum care    Specimen:  Placenta sent to pathology Yes    Estimated blood loss: 300 cc             Condition:  infant stable to general nursery and mother stable                Infant Feeding:    breast    Ho Díaz MD,MDEN. 8/6/2024 5:53 PM    Tiera Bright

## 2024-08-06 NOTE — PLAN OF CARE
Problem: Vaginal Birth or  Section  Goal: Fetal and maternal status remain reassuring during the birth process  Description:  Birth OB-Pregnancy care plan goal which identifies if the fetal and maternal status remain reassuring during the birth process  Outcome: Progressing     Problem: Postpartum  Goal: Experiences normal postpartum course  Description:  Postpartum OB-Pregnancy care plan goal which identifies if the mother is experiencing a normal postpartum course  Outcome: Progressing     Problem: Postpartum  Goal: Appropriate maternal -  bonding  Description:  Postpartum OB-Pregnancy care plan goal which identifies if the mother and  are bonding appropriately  Outcome: Progressing     Problem: Postpartum  Goal: Establishment of infant feeding pattern  Description:  Postpartum OB-Pregnancy care plan goal which identifies if the mother is establishing a feeding pattern with their   Outcome: Progressing     Problem: Postpartum  Goal: Incisions, wounds, or drain sites healing without S/S of infection  Outcome: Progressing     Problem: Pain  Goal: Verbalizes/displays adequate comfort level or baseline comfort level  Outcome: Progressing     Problem: Infection - Adult  Goal: Absence of infection at discharge  Outcome: Progressing     Problem: Infection - Adult  Goal: Absence of infection during hospitalization  Outcome: Progressing     Problem: Infection - Adult  Goal: Absence of fever/infection during anticipated neutropenic period  Outcome: Progressing     Problem: Safety - Adult  Goal: Free from fall injury  Outcome: Progressing     Problem: Discharge Planning  Goal: Discharge to home or other facility with appropriate resources  Outcome: Progressing

## 2024-08-06 NOTE — PROGRESS NOTES
Dr. Díaz at the bedside to perform SVE, review EFM and perform bedside ultrasound to confirm vertex position.

## 2024-08-06 NOTE — PROGRESS NOTES
This RN at the bedside to stand by assist patient up to the bathroom and shower for the first time after delivery. Patient tolerated ambulation well.

## 2024-08-06 NOTE — PROGRESS NOTES
Dr. Díaz notified of patient request for pain medicine, SVE and EFM. Orders for Nubain 10mg q3hr.

## 2024-08-07 LAB
HCT VFR BLD AUTO: 29.4 % (ref 34–48)
HGB BLD-MCNC: 9.2 G/DL (ref 11.5–15.5)
RESULT: NEGATIVE
RPR SER QL: NONREACTIVE

## 2024-08-07 PROCEDURE — 85014 HEMATOCRIT: CPT

## 2024-08-07 PROCEDURE — 6360000002 HC RX W HCPCS: Performed by: OBSTETRICS & GYNECOLOGY

## 2024-08-07 PROCEDURE — 96372 THER/PROPH/DIAG INJ SC/IM: CPT

## 2024-08-07 PROCEDURE — 85018 HEMOGLOBIN: CPT

## 2024-08-07 PROCEDURE — 85461 HEMOGLOBIN FETAL: CPT

## 2024-08-07 PROCEDURE — 1220000001 HC SEMI PRIVATE L&D R&B

## 2024-08-07 PROCEDURE — 6370000000 HC RX 637 (ALT 250 FOR IP): Performed by: OBSTETRICS & GYNECOLOGY

## 2024-08-07 RX ADMIN — FERROUS SULFATE TAB 325 MG (65 MG ELEMENTAL FE) 325 MG: 325 (65 FE) TAB at 08:04

## 2024-08-07 RX ADMIN — IBUPROFEN 800 MG: 800 TABLET, FILM COATED ORAL at 15:56

## 2024-08-07 RX ADMIN — FERROUS SULFATE TAB 325 MG (65 MG ELEMENTAL FE) 325 MG: 325 (65 FE) TAB at 18:36

## 2024-08-07 RX ADMIN — HUMAN RHO(D) IMMUNE GLOBULIN 300 MCG: 1500 SOLUTION INTRAMUSCULAR; INTRAVENOUS at 08:19

## 2024-08-07 RX ADMIN — ACETAMINOPHEN 1000 MG: 500 TABLET ORAL at 08:04

## 2024-08-07 RX ADMIN — IBUPROFEN 800 MG: 800 TABLET, FILM COATED ORAL at 02:56

## 2024-08-07 RX ADMIN — ACETAMINOPHEN 1000 MG: 500 TABLET ORAL at 21:05

## 2024-08-07 RX ADMIN — DOCUSATE SODIUM 100 MG: 100 CAPSULE, LIQUID FILLED ORAL at 08:04

## 2024-08-07 ASSESSMENT — PAIN SCALES - GENERAL
PAINLEVEL_OUTOF10: 2
PAINLEVEL_OUTOF10: 0
PAINLEVEL_OUTOF10: 2
PAINLEVEL_OUTOF10: 4
PAINLEVEL_OUTOF10: 2
PAINLEVEL_OUTOF10: 0

## 2024-08-07 ASSESSMENT — PAIN DESCRIPTION - LOCATION
LOCATION: ABDOMEN
LOCATION: ABDOMEN;BACK
LOCATION: ABDOMEN
LOCATION: ABDOMEN

## 2024-08-07 ASSESSMENT — PAIN DESCRIPTION - DESCRIPTORS
DESCRIPTORS: CRAMPING
DESCRIPTORS: ACHING
DESCRIPTORS: CRAMPING
DESCRIPTORS: CRAMPING

## 2024-08-07 ASSESSMENT — PAIN DESCRIPTION - ORIENTATION
ORIENTATION: MID;LOWER
ORIENTATION: LOWER
ORIENTATION: LOWER;MID

## 2024-08-07 NOTE — PLAN OF CARE
Problem: Vaginal Birth or  Section  Goal: Fetal and maternal status remain reassuring during the birth process  Description:  Birth OB-Pregnancy care plan goal which identifies if the fetal and maternal status remain reassuring during the birth process  2024 by Andria Spivey RN  Outcome: Completed     Problem: Postpartum  Goal: Experiences normal postpartum course  Description:  Postpartum OB-Pregnancy care plan goal which identifies if the mother is experiencing a normal postpartum course  2024 by Mulu Mancuso RN  Outcome: Progressing  2024 by Andria Spivey RN  Outcome: Progressing  Goal: Appropriate maternal -  bonding  Description:  Postpartum OB-Pregnancy care plan goal which identifies if the mother and  are bonding appropriately  2024 by Mulu Mancuso RN  Outcome: Progressing  2024 by Andria Spivey RN  Outcome: Progressing  Goal: Establishment of infant feeding pattern  Description:  Postpartum OB-Pregnancy care plan goal which identifies if the mother is establishing a feeding pattern with their   Outcome: Progressing  Goal: Incisions, wounds, or drain sites healing without S/S of infection  Outcome: Progressing     Problem: Pain  Goal: Verbalizes/displays adequate comfort level or baseline comfort level  Outcome: Progressing     Problem: Infection - Adult  Goal: Absence of infection at discharge  Outcome: Progressing  Goal: Absence of infection during hospitalization  Outcome: Progressing  Goal: Absence of fever/infection during anticipated neutropenic period  Outcome: Progressing     Problem: Safety - Adult  Goal: Free from fall injury  Outcome: Progressing     Problem: Discharge Planning  Goal: Discharge to home or other facility with appropriate resources  Outcome: Progressing

## 2024-08-07 NOTE — PROGRESS NOTES
Assumed care of pt for this shift. Discussed plan of care for the shift. Pt verbalizes understanding without questions at this time.  Pt denies any pain or discomfort. Rest encouraged, call light in reach.

## 2024-08-07 NOTE — PLAN OF CARE
Problem: Postpartum  Goal: Experiences normal postpartum course  Description:  Postpartum OB-Pregnancy care plan goal which identifies if the mother is experiencing a normal postpartum course  Outcome: Progressing  Goal: Appropriate maternal -  bonding  Description:  Postpartum OB-Pregnancy care plan goal which identifies if the mother and  are bonding appropriately  Outcome: Progressing

## 2024-08-07 NOTE — LACTATION NOTE
This note was copied from a baby's chart.  Lactation Consult Note      Clinician Requesting Consult:  Dr Urbano    Allergies:  Patient has no known allergies.    Observations: Bonding and mom and baby comfortable.     Discussions: Lactation Consultation services explained, Lactation Consultation packet reviewed, Breastfeeding basics reviewed, Diet related to breastfeeding, Latch on, Method to wake a sleepy baby, and Instructed to bring feeding log to pediatrician visits    Demonstrated/Encouraged: Encouraged to use skin to skin. Mom requested a hand pump. Mom states she has no issues and feels completely comfortable breast feeding. She will call out if she needs any assistance.

## 2024-08-07 NOTE — PROGRESS NOTES
Subjective:     Postpartum Day 1   The patient feels well. The patient denies emotional concerns. Pain is well controlled with current medications. The baby iswell. Baby is feeding via breast. Urinary output is adequate. The patient is ambulating well. The patient is tolerating a normal diet. Flatus has been passed.    Objective:        /74   Pulse 75   Temp 97.6 °F (36.4 °C) (Oral)   Resp 16   SpO2 100%   Breastfeeding Unknown   No intake/output data recorded.         General:    alert, appears stated age, and cooperative   Lungs:    Lochia:  appropriate   Uterine    firmnontender   Back         no pain to palpation   DVT Evaluation:  No evidence of DVT seen on physical exam.  Negative Justin's sign.     Recent Results (from the past 72 hour(s))   CBC auto differential    Collection Time: 08/06/24  6:50 AM   Result Value Ref Range    WBC 8.6 4.5 - 11.5 k/uL    RBC 4.16 3.50 - 5.50 m/uL    Hemoglobin 10.1 (L) 11.5 - 15.5 g/dL    Hematocrit 32.9 (L) 34.0 - 48.0 %    MCV 79.1 (L) 80.0 - 99.9 fL    MCH 24.3 (L) 26.0 - 35.0 pg    MCHC 30.7 (L) 32.0 - 34.5 g/dL    RDW 14.9 11.5 - 15.0 %    Platelets 237 130 - 450 k/uL    MPV 9.3 7.0 - 12.0 fL    Neutrophils % 69 43.0 - 80.0 %    Lymphocytes % 23 20.0 - 42.0 %    Monocytes % 6 2.0 - 12.0 %    Eosinophils % 1 0 - 6 %    Basophils % 0 0.0 - 2.0 %    Immature Granulocytes % 1 0.0 - 5.0 %    Neutrophils Absolute 5.91 1.80 - 7.30 k/uL    Lymphocytes Absolute 1.94 1.50 - 4.00 k/uL    Monocytes Absolute 0.54 0.10 - 0.95 k/uL    Eosinophils Absolute 0.08 0.05 - 0.50 k/uL    Basophils Absolute 0.03 0.00 - 0.20 k/uL    Immature Granulocytes Absolute 0.08 0.00 - 0.58 k/uL   Comprehensive metabolic panel    Collection Time: 08/06/24  6:50 AM   Result Value Ref Range    Sodium 136 132 - 146 mmol/L    Potassium 4.0 3.5 - 5.0 mmol/L    Chloride 106 98 - 107 mmol/L    CO2 19 (L) 22 - 29 mmol/L    Anion Gap 11 7 - 16 mmol/L    Glucose 81 74 - 99 mg/dL    BUN 9 6 - 20 mg/dL     Normal labor  Resolved Problems:    * No resolved hospital problems. *    Plan:     Continue current care.Prenatal vit daily,  Pain meds as needed, routine postpartum care  Iron sulfate 325 1 p.o. twice every other day.      Ho Díaz MD,M.D. 8/7/2024 3:39 PM    Tiera Bright  1995  [id number]         Ho Díaz MD,M.D. 8/7/2024 3:39 PM    Tiera Bright  1995  [id number]

## 2024-08-07 NOTE — PROGRESS NOTES
Hearing screening results were discussed with parent. Questions answered. Brochure given to parent. Advised to monitor developmental milestones and contact physician for any concerns.   Electronically signed by Eliza Chamorro on 8/7/2024 at 8:53 AM

## 2024-08-08 VITALS
OXYGEN SATURATION: 99 % | SYSTOLIC BLOOD PRESSURE: 106 MMHG | RESPIRATION RATE: 16 BRPM | HEART RATE: 75 BPM | DIASTOLIC BLOOD PRESSURE: 57 MMHG | TEMPERATURE: 98 F

## 2024-08-08 LAB
COMPONENT: NORMAL
STATUS OF UNITS: NORMAL
TRANSFUSION STATUS: NORMAL
UNIT DIVISION: 0
UNIT NUMBER: NORMAL

## 2024-08-08 PROCEDURE — 6370000000 HC RX 637 (ALT 250 FOR IP): Performed by: OBSTETRICS & GYNECOLOGY

## 2024-08-08 RX ORDER — IBUPROFEN 800 MG/1
TABLET ORAL
Qty: 28 TABLET | Refills: 0 | Status: SHIPPED | OUTPATIENT
Start: 2024-08-08

## 2024-08-08 RX ORDER — FERROUS SULFATE 325(65) MG
325 TABLET ORAL 2 TIMES DAILY
Qty: 180 TABLET | Refills: 0 | Status: SHIPPED | OUTPATIENT
Start: 2024-08-08 | End: 2024-11-06

## 2024-08-08 RX ADMIN — IBUPROFEN 800 MG: 800 TABLET, FILM COATED ORAL at 09:14

## 2024-08-08 RX ADMIN — FERROUS SULFATE TAB 325 MG (65 MG ELEMENTAL FE) 325 MG: 325 (65 FE) TAB at 09:15

## 2024-08-08 RX ADMIN — DOCUSATE SODIUM 100 MG: 100 CAPSULE, LIQUID FILLED ORAL at 09:14

## 2024-08-08 ASSESSMENT — PAIN DESCRIPTION - LOCATION: LOCATION: ABDOMEN

## 2024-08-08 ASSESSMENT — PAIN DESCRIPTION - DESCRIPTORS: DESCRIPTORS: CRAMPING

## 2024-08-08 ASSESSMENT — PAIN SCALES - GENERAL: PAINLEVEL_OUTOF10: 3

## 2024-08-08 ASSESSMENT — PAIN - FUNCTIONAL ASSESSMENT: PAIN_FUNCTIONAL_ASSESSMENT: ACTIVITIES ARE NOT PREVENTED

## 2024-08-08 NOTE — PLAN OF CARE

## 2024-08-08 NOTE — DISCHARGE INSTRUCTIONS
Return to office in 4 weeks for follow-up call as needed problems routine postpartum instruction to the patient to continue with the prenatal vitamin .  Ferrous sulfate twice a day every other day.  Tub bath in 6 weeks  You may take a shower  Driving 2-3 weeks  Sexual activity 6 weeks  Work/School  6 weeks  Walking  As tolerated  Stairs as tolerated  Lifting no heavy lifting         Vaginal Childbirth: Care Instructions  Overview     Vaginal birth means delivering a baby through the birth canal (vagina). During labor, the uterus tightens (contracts) regularly to thin and open the cervix and to push the baby out through the birth canal.  Your body will slowly heal in the next few weeks. It's easy to get too tired and overwhelmed during the first weeks after your baby is born. Changes in your hormones can shift your mood without warning. You may find it hard to meet the extra demands on your energy and time. Take it easy on yourself.  Follow-up care is a key part of your treatment and safety. Be sure to make and go to all appointments, and call your doctor if you are having problems. It's also a good idea to know your test results and keep a list of the medicines you take.  How can you care for yourself at home?  Vaginal bleeding and cramps  After delivery, you will have a bloody discharge from your vagina. This will turn pink within a week and then white or yellow after about 10 days. It may last for 2 to 4 weeks or longer, until the uterus has healed. Use sanitary pads until you stop bleeding. Using pads makes it easier to monitor your bleeding.  Don't worry if you pass some blood clots, as long as they are smaller than a golf ball. If you have a tear or stitches in your vaginal area, change the pad at least every 4 hours. This will help prevent soreness and infection.  You may have cramps for the first few days after childbirth. These are normal and occur as the uterus shrinks to normal size. Take an  [employer] disclaims any warranty or liability for your use of this information.

## 2024-08-08 NOTE — PROGRESS NOTES
Subjective:     Postpartum Day 2  The patient feels well. The patient denies emotional concerns. Pain is well controlled with current medications. The baby iswell. Baby is feeding via breast. Urinary output is adequate. The patient is ambulating well. The patient is tolerating a normal diet. Flatus has been passed.    Objective:        BP (!) 106/57   Pulse 75   Temp 98 °F (36.7 °C) (Oral)   Resp 16   SpO2 99%   Breastfeeding Unknown   No intake/output data recorded.         General:    alert, appears stated age, and cooperative   Lungs:    Lochia:  appropriate   Uterine    firmnontender   Back         no pain to palpation   DVT Evaluation:  No evidence of DVT seen on physical exam.  Negative Justin's sign.     Recent Results (from the past 72 hour(s))   CBC auto differential    Collection Time: 08/06/24  6:50 AM   Result Value Ref Range    WBC 8.6 4.5 - 11.5 k/uL    RBC 4.16 3.50 - 5.50 m/uL    Hemoglobin 10.1 (L) 11.5 - 15.5 g/dL    Hematocrit 32.9 (L) 34.0 - 48.0 %    MCV 79.1 (L) 80.0 - 99.9 fL    MCH 24.3 (L) 26.0 - 35.0 pg    MCHC 30.7 (L) 32.0 - 34.5 g/dL    RDW 14.9 11.5 - 15.0 %    Platelets 237 130 - 450 k/uL    MPV 9.3 7.0 - 12.0 fL    Neutrophils % 69 43.0 - 80.0 %    Lymphocytes % 23 20.0 - 42.0 %    Monocytes % 6 2.0 - 12.0 %    Eosinophils % 1 0 - 6 %    Basophils % 0 0.0 - 2.0 %    Immature Granulocytes % 1 0.0 - 5.0 %    Neutrophils Absolute 5.91 1.80 - 7.30 k/uL    Lymphocytes Absolute 1.94 1.50 - 4.00 k/uL    Monocytes Absolute 0.54 0.10 - 0.95 k/uL    Eosinophils Absolute 0.08 0.05 - 0.50 k/uL    Basophils Absolute 0.03 0.00 - 0.20 k/uL    Immature Granulocytes Absolute 0.08 0.00 - 0.58 k/uL   Comprehensive metabolic panel    Collection Time: 08/06/24  6:50 AM   Result Value Ref Range    Sodium 136 132 - 146 mmol/L    Potassium 4.0 3.5 - 5.0 mmol/L    Chloride 106 98 - 107 mmol/L    CO2 19 (L) 22 - 29 mmol/L    Anion Gap 11 7 - 16 mmol/L    Glucose 81 74 - 99 mg/dL    BUN 9 6 - 20 mg/dL

## 2024-08-08 NOTE — PROGRESS NOTES
CLINICAL PHARMACY NOTE: MEDS TO BEDS    Total # of Prescriptions Filled: 2   The following medications were delivered to the patient:  Ibuprofen 800 mg  Ferosul 325 mg    Additional Documentation:

## 2024-08-08 NOTE — DISCHARGE SUMMARY
results are for medical purposes only and should not be considered definitive or confirmed.   TYPE AND SCREEN    Collection Time: 24  6:54 AM   Result Value Ref Range    Blood Bank Sample Expiration 2024,2359     Arm Band Number [id number]     ABO/Rh A NEGATIVE     Antibody Screen POSITIVE     Antibody ID Anti-D, Passive Due To RhIG     JERMAINE, Polyspecific NEGATIVE    FETAL SCREEN    Collection Time: 24 12:51 AM   Result Value Ref Range    Result NEGATIVE    Hemoglobin and hematocrit, blood    Collection Time: 24  5:51 AM   Result Value Ref Range    Hemoglobin 9.2 (L) 11.5 - 15.5 g/dL    Hematocrit 29.4 (L) 34.0 - 48.0 %   [facility] LAB NOTIFICATION    Collection Time: 24  7:32 AM   Result Value Ref Range    Unit Number [id number]     Component RHIG     Unit Divison 00     Status of Units TRANSFUSED     Transfusion Status OK TO TRANSFUSE        Disposition: home    Patient Instructions:   Activity: activity as tolerated  Diet: regular diet  Wound Care: none needed    Discharge Medication:      Medication List        START taking these medications      ferrous sulfate 325 (65 Fe) MG tablet  Commonly known as: IRON 325  Take 1 tablet by mouth 2 times daily     ibuprofen 800 MG tablet  Commonly known as: IBU  1 tablet orally every 6-8 hours as needed for pain            CONTINUE taking these medications      PRE- FORMULA PO               Where to Get Your Medications        These medications were sent to [facility] - [address] - P [phone] - F [phone]  [address] [place], [place] [address] [facility] [address]      Phone: [phone]   ferrous sulfate 325 (65 Fe) MG tablet  ibuprofen 800 MG tablet          Follow-up with Ho Díaz MD, M.D. in 4 weeks.    Signed:  Ho Díaz MD,MDEN.  2024  8:08 AM

## 2024-08-08 NOTE — PLAN OF CARE
Problem: Postpartum  Goal: Experiences normal postpartum course  Description:  Postpartum OB-Pregnancy care plan goal which identifies if the mother is experiencing a normal postpartum course  2024 by Nicolle Lyons RN  Outcome: Completed  2024 by Delmi Dao RN  Outcome: Progressing  Goal: Appropriate maternal -  bonding  Description:  Postpartum OB-Pregnancy care plan goal which identifies if the mother and  are bonding appropriately  2024 by Nicolle Lyons RN  Outcome: Completed  2024 by Delmi Dao RN  Outcome: Progressing  Goal: Establishment of infant feeding pattern  Description:  Postpartum OB-Pregnancy care plan goal which identifies if the mother is establishing a feeding pattern with their   2024 by Nicolle Lyons RN  Outcome: Completed  2024 by Delmi Dao RN  Outcome: Progressing  Goal: Incisions, wounds, or drain sites healing without S/S of infection  2024 by Nicolle Lyons RN  Outcome: Completed  2024 by Delmi Dao RN  Outcome: Progressing     Problem: Pain  Goal: Verbalizes/displays adequate comfort level or baseline comfort level  2024 by Nicolle Lyons RN  Outcome: Completed  2024 by Delmi Dao RN  Outcome: Progressing     Problem: Infection - Adult  Goal: Absence of infection at discharge  2024 by Nicolle Lyons RN  Outcome: Completed  2024 by Delmi Dao RN  Outcome: Progressing  Goal: Absence of infection during hospitalization  Outcome: Completed  Goal: Absence of fever/infection during anticipated neutropenic period  2024 by Nicolle Lyons RN  Outcome: Completed  2024 by Delmi Dao RN  Outcome: Progressing     Problem: Safety - Adult  Goal: Free from fall injury  2024 by Nicolle Lyons RN  Outcome: Completed  2024 by Delmi Dao RN  Outcome: Progressing      Problem: Discharge Planning  Goal: Discharge to home or other facility with appropriate resources  8/8/2024 0911 by Nicolle Lyons, RN  Outcome: Completed  8/7/2024 2342 by Delmi Dao, RN  Outcome: Progressing

## 2024-08-15 LAB — SURGICAL PATHOLOGY REPORT: NORMAL

## (undated) DEVICE — PAD,NON-ADHERENT,3X8,STERILE,LF,1/PK: Brand: MEDLINE

## (undated) DEVICE — SET FLD COLL CLR W/ BLT IN WARN SYS FOR HYSTSCP DOLPHIN

## (undated) DEVICE — GLOVE SURG SZ 7 CRM LTX FREE POLYISOPRENE POLYMER BEAD ANTI

## (undated) DEVICE — DRAPE,REIN 53X77,STERILE: Brand: MEDLINE

## (undated) DEVICE — MARKER,SKIN,WI/RULER AND LABELS: Brand: MEDLINE

## (undated) DEVICE — GOWN,SIRUS,FABRNF,XL,20/CS: Brand: MEDLINE

## (undated) DEVICE — GOWN,SIRUS,FABRNF,L,20/CS: Brand: MEDLINE

## (undated) DEVICE — JELLY,LUBE,STERILE,FLIP TOP,TUBE,2-OZ: Brand: MEDLINE

## (undated) DEVICE — COVER,LIGHT HANDLE,FLX,2/PK: Brand: MEDLINE INDUSTRIES, INC.

## (undated) DEVICE — GAUZE,SPONGE,4"X4",16PLY,XRAY,STRL,LF: Brand: MEDLINE

## (undated) DEVICE — TOWEL,OR,DSP,ST,BLUE,STD,6/PK,12PK/CS: Brand: MEDLINE

## (undated) DEVICE — TRAY,VAG PREP,2PR VNYL GLV,4 C: Brand: MEDLINE INDUSTRIES, INC.

## (undated) DEVICE — LEGGINGS, PAIR, 31X48, STERILE: Brand: MEDLINE

## (undated) DEVICE — HANDLE SUCT TBNG L6FR DIA3/8IN SWVL W/ M ADPT FOR BERK PMP

## (undated) DEVICE — PAD,SANITARY,11 IN,MAXI,N-STRL,IND WRAP: Brand: MEDLINE

## (undated) DEVICE — DOUBLE BASIN SET: Brand: MEDLINE INDUSTRIES, INC.

## (undated) DEVICE — TRAP TISS DISP FOR COLL SYS BERK SAFETOUCH

## (undated) DEVICE — SYSTEM COLL W/ TISS TRAP INCLUDE COLL CANSTR LID SET OF

## (undated) DEVICE — CURETTE VAC DIA8MM PLAS CRV OPN TIP RIG DISP VACURET D/E